# Patient Record
Sex: FEMALE | Race: WHITE | Employment: PART TIME | URBAN - METROPOLITAN AREA
[De-identification: names, ages, dates, MRNs, and addresses within clinical notes are randomized per-mention and may not be internally consistent; named-entity substitution may affect disease eponyms.]

---
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
## 2019-09-03 ENCOUNTER — OFFICE VISIT (OUTPATIENT)
Dept: FAMILY MEDICINE CLINIC | Age: 19
End: 2019-09-03
Payer: MEDICARE

## 2019-09-03 ENCOUNTER — HOSPITAL ENCOUNTER (OUTPATIENT)
Dept: GENERAL RADIOLOGY | Facility: CLINIC | Age: 19
Discharge: HOME | End: 2019-09-05
Payer: MEDICARE

## 2019-09-03 ENCOUNTER — HOSPITAL ENCOUNTER (OUTPATIENT)
Facility: CLINIC | Age: 19
Discharge: HOME | End: 2019-09-05
Payer: MEDICARE

## 2019-09-03 ENCOUNTER — HOSPITAL ENCOUNTER (OUTPATIENT)
Age: 19
Setting detail: SPECIMEN
Discharge: HOME | End: 2019-09-03
Payer: MEDICARE

## 2019-09-03 VITALS
SYSTOLIC BLOOD PRESSURE: 117 MMHG | HEART RATE: 92 BPM | TEMPERATURE: 98.2 F | BODY MASS INDEX: 20.31 KG/M2 | WEIGHT: 129.4 LBS | HEIGHT: 67 IN | DIASTOLIC BLOOD PRESSURE: 68 MMHG | OXYGEN SATURATION: 100 %

## 2019-09-03 DIAGNOSIS — R63.4 WEIGHT LOSS: ICD-10-CM

## 2019-09-03 DIAGNOSIS — R21 RASH: ICD-10-CM

## 2019-09-03 DIAGNOSIS — D72.829 LEUKOCYTOSIS, UNSPECIFIED TYPE: ICD-10-CM

## 2019-09-03 DIAGNOSIS — D72.829 LEUKOCYTOSIS, UNSPECIFIED TYPE: Primary | ICD-10-CM

## 2019-09-03 LAB
ABSOLUTE EOS #: 0.05 K/UL (ref 0–0.44)
ABSOLUTE IMMATURE GRANULOCYTE: <0.03 K/UL (ref 0–0.3)
ABSOLUTE LYMPH #: 1.6 K/UL (ref 1.2–5.2)
ABSOLUTE MONO #: 0.34 K/UL (ref 0.1–1.4)
ALBUMIN SERPL-MCNC: 4.2 G/DL (ref 3.5–5.2)
ALBUMIN/GLOBULIN RATIO: 1.6 (ref 1–2.5)
ALP BLD-CCNC: 33 U/L (ref 35–104)
ALT SERPL-CCNC: 11 U/L (ref 5–33)
ANION GAP SERPL CALCULATED.3IONS-SCNC: 14 MMOL/L (ref 9–17)
AST SERPL-CCNC: 15 U/L
BASOPHILS # BLD: 0 % (ref 0–2)
BASOPHILS ABSOLUTE: <0.03 K/UL (ref 0–0.2)
BILIRUB SERPL-MCNC: 0.22 MG/DL (ref 0.3–1.2)
BILIRUBIN, POC: NORMAL
BLOOD URINE, POC: NORMAL
BUN BLDV-MCNC: 8 MG/DL (ref 6–20)
BUN/CREAT BLD: ABNORMAL (ref 9–20)
C-REACTIVE PROTEIN: 11 MG/L (ref 0–5)
CALCIUM SERPL-MCNC: 9.1 MG/DL (ref 8.6–10.4)
CHLORIDE BLD-SCNC: 104 MMOL/L (ref 98–107)
CLARITY, POC: CLEAR
CO2: 22 MMOL/L (ref 20–31)
COLOR, POC: YELLOW
CONTROL: PRESENT
CREAT SERPL-MCNC: 0.72 MG/DL (ref 0.5–0.9)
DIFFERENTIAL TYPE: ABNORMAL
EOSINOPHILS RELATIVE PERCENT: 1 % (ref 1–4)
GFR AFRICAN AMERICAN: >60 ML/MIN
GFR NON-AFRICAN AMERICAN: >60 ML/MIN
GFR SERPL CREATININE-BSD FRML MDRD: ABNORMAL ML/MIN/{1.73_M2}
GFR SERPL CREATININE-BSD FRML MDRD: ABNORMAL ML/MIN/{1.73_M2}
GLUCOSE BLD-MCNC: 83 MG/DL (ref 70–99)
GLUCOSE URINE, POC: NORMAL
HCT VFR BLD CALC: 39.3 % (ref 36.3–47.1)
HEMOGLOBIN: 12 G/DL (ref 11.9–15.1)
IMMATURE GRANULOCYTES: 0 %
KETONES, POC: NORMAL
LACTATE DEHYDROGENASE: 193 U/L (ref 135–214)
LEUKOCYTE EST, POC: NORMAL
LYMPHOCYTES # BLD: 28 % (ref 25–45)
MCH RBC QN AUTO: 29.2 PG (ref 25.2–33.5)
MCHC RBC AUTO-ENTMCNC: 30.5 G/DL (ref 28.4–34.8)
MCV RBC AUTO: 95.6 FL (ref 82.6–102.9)
MONOCYTES # BLD: 6 % (ref 2–8)
NITRITE, POC: NORMAL
NRBC AUTOMATED: 0 PER 100 WBC
PDW BLD-RTO: 13.7 % (ref 11.8–14.4)
PH, POC: 6
PLATELET # BLD: 221 K/UL (ref 138–453)
PLATELET ESTIMATE: ABNORMAL
PMV BLD AUTO: 12.3 FL (ref 8.1–13.5)
POTASSIUM SERPL-SCNC: 4.7 MMOL/L (ref 3.7–5.3)
PREGNANCY TEST URINE, POC: NORMAL
PROTEIN, POC: NORMAL
RBC # BLD: 4.11 M/UL (ref 3.95–5.11)
RBC # BLD: ABNORMAL 10*6/UL
SEDIMENTATION RATE, ERYTHROCYTE: 5 MM (ref 0–20)
SEG NEUTROPHILS: 65 % (ref 34–64)
SEGMENTED NEUTROPHILS ABSOLUTE COUNT: 3.61 K/UL (ref 1.8–8)
SODIUM BLD-SCNC: 140 MMOL/L (ref 135–144)
SPECIFIC GRAVITY, POC: 1.03
TOTAL PROTEIN: 6.9 G/DL (ref 6.4–8.3)
TSH SERPL DL<=0.05 MIU/L-ACNC: 1.36 MIU/L (ref 0.3–5)
UROBILINOGEN, POC: 0.2
WBC # BLD: 5.6 K/UL (ref 4.5–13.5)
WBC # BLD: ABNORMAL 10*3/UL

## 2019-09-03 PROCEDURE — 84443 ASSAY THYROID STIM HORMONE: CPT

## 2019-09-03 PROCEDURE — 83615 LACTATE (LD) (LDH) ENZYME: CPT

## 2019-09-03 PROCEDURE — 85651 RBC SED RATE NONAUTOMATED: CPT

## 2019-09-03 PROCEDURE — G8427 DOCREV CUR MEDS BY ELIG CLIN: HCPCS | Performed by: PHYSICIAN ASSISTANT

## 2019-09-03 PROCEDURE — 99213 OFFICE O/P EST LOW 20 MIN: CPT | Performed by: PHYSICIAN ASSISTANT

## 2019-09-03 PROCEDURE — G8420 CALC BMI NORM PARAMETERS: HCPCS | Performed by: PHYSICIAN ASSISTANT

## 2019-09-03 PROCEDURE — 80053 COMPREHEN METABOLIC PANEL: CPT

## 2019-09-03 PROCEDURE — 82272 OCCULT BLD FECES 1-3 TESTS: CPT

## 2019-09-03 PROCEDURE — 85025 COMPLETE CBC W/AUTO DIFF WBC: CPT

## 2019-09-03 PROCEDURE — 81025 URINE PREGNANCY TEST: CPT | Performed by: PHYSICIAN ASSISTANT

## 2019-09-03 PROCEDURE — 71046 X-RAY EXAM CHEST 2 VIEWS: CPT

## 2019-09-03 PROCEDURE — 86140 C-REACTIVE PROTEIN: CPT

## 2019-09-03 PROCEDURE — 36415 COLL VENOUS BLD VENIPUNCTURE: CPT

## 2019-09-03 PROCEDURE — 1036F TOBACCO NON-USER: CPT | Performed by: PHYSICIAN ASSISTANT

## 2019-09-03 RX ORDER — NORETHINDRONE ACETATE AND ETHINYL ESTRADIOL 1.5-30(21)
KIT ORAL
Refills: 3 | COMMUNITY
Start: 2019-08-20 | End: 2020-09-23

## 2019-09-03 ASSESSMENT — ENCOUNTER SYMPTOMS
EYE DISCHARGE: 0
ABDOMINAL PAIN: 0
SINUS PRESSURE: 0
CHEST TIGHTNESS: 0
VOMITING: 0
RHINORRHEA: 0
EYE ITCHING: 0
SHORTNESS OF BREATH: 0
DIARRHEA: 0
COUGH: 0
SORE THROAT: 0
NAUSEA: 0

## 2019-09-03 ASSESSMENT — PATIENT HEALTH QUESTIONNAIRE - PHQ9
SUM OF ALL RESPONSES TO PHQ9 QUESTIONS 1 & 2: 0
SUM OF ALL RESPONSES TO PHQ QUESTIONS 1-9: 0
1. LITTLE INTEREST OR PLEASURE IN DOING THINGS: 0
SUM OF ALL RESPONSES TO PHQ QUESTIONS 1-9: 0
2. FEELING DOWN, DEPRESSED OR HOPELESS: 0

## 2019-09-05 ENCOUNTER — HOSPITAL ENCOUNTER (OUTPATIENT)
Age: 19
Setting detail: SPECIMEN
Discharge: HOME | End: 2019-09-05
Payer: MEDICARE

## 2019-09-05 ENCOUNTER — OFFICE VISIT (OUTPATIENT)
Dept: DERMATOLOGY | Age: 19
End: 2019-09-05
Payer: MEDICARE

## 2019-09-05 VITALS
HEART RATE: 89 BPM | WEIGHT: 130 LBS | BODY MASS INDEX: 20.4 KG/M2 | OXYGEN SATURATION: 100 % | SYSTOLIC BLOOD PRESSURE: 130 MMHG | DIASTOLIC BLOOD PRESSURE: 78 MMHG | HEIGHT: 67 IN

## 2019-09-05 DIAGNOSIS — L30.8 OTHER SPECIFIED DERMATITIS: Primary | ICD-10-CM

## 2019-09-05 DIAGNOSIS — L92.3 TATTOO REACTION: ICD-10-CM

## 2019-09-05 LAB
DATE, STOOL #1: NORMAL
DATE, STOOL #2: NORMAL
DATE, STOOL #3: NORMAL
HEMOCCULT SP1 STL QL: NEGATIVE
HEMOCCULT SP2 STL QL: NEGATIVE
HEMOCCULT SP3 STL QL: NEGATIVE
TIME, STOOL #1: NORMAL
TIME, STOOL #2: NORMAL
TIME, STOOL #3: NORMAL

## 2019-09-05 PROCEDURE — 11105 PUNCH BX SKIN EA SEP/ADDL: CPT | Performed by: DERMATOLOGY

## 2019-09-05 PROCEDURE — 11104 PUNCH BX SKIN SINGLE LESION: CPT | Performed by: DERMATOLOGY

## 2019-09-05 NOTE — PROGRESS NOTES
can be provided that every mistake has been identified and corrected byediting.     Electronically signed by Milo Rush MD on 9/5/19 at 3:58 PM

## 2019-09-06 RX ORDER — LIDOCAINE HYDROCHLORIDE AND EPINEPHRINE 10; 10 MG/ML; UG/ML
0.5 INJECTION, SOLUTION INFILTRATION; PERINEURAL ONCE
Status: SHIPPED | OUTPATIENT
Start: 2019-09-06

## 2019-09-12 LAB — DERMATOLOGY PATHOLOGY REPORT: NORMAL

## 2019-09-19 ENCOUNTER — HOSPITAL ENCOUNTER (OUTPATIENT)
Age: 19
Setting detail: SPECIMEN
Discharge: HOME | End: 2019-09-19
Payer: MEDICARE

## 2019-09-19 ENCOUNTER — OFFICE VISIT (OUTPATIENT)
Dept: DERMATOLOGY | Age: 19
End: 2019-09-19
Payer: MEDICARE

## 2019-09-19 VITALS
SYSTOLIC BLOOD PRESSURE: 126 MMHG | HEART RATE: 75 BPM | BODY MASS INDEX: 20.4 KG/M2 | WEIGHT: 130 LBS | OXYGEN SATURATION: 97 % | DIASTOLIC BLOOD PRESSURE: 80 MMHG | HEIGHT: 67 IN

## 2019-09-19 DIAGNOSIS — S01.331A COMPLICATION OF RIGHT EAR PIERCING, INITIAL ENCOUNTER: ICD-10-CM

## 2019-09-19 DIAGNOSIS — L30.8 OTHER SPECIFIED DERMATITIS: Primary | ICD-10-CM

## 2019-09-19 DIAGNOSIS — L30.8 OTHER SPECIFIED DERMATITIS: ICD-10-CM

## 2019-09-19 PROCEDURE — G8427 DOCREV CUR MEDS BY ELIG CLIN: HCPCS | Performed by: DERMATOLOGY

## 2019-09-19 PROCEDURE — G8420 CALC BMI NORM PARAMETERS: HCPCS | Performed by: DERMATOLOGY

## 2019-09-19 PROCEDURE — 86038 ANTINUCLEAR ANTIBODIES: CPT

## 2019-09-19 PROCEDURE — 36415 COLL VENOUS BLD VENIPUNCTURE: CPT

## 2019-09-19 PROCEDURE — 99213 OFFICE O/P EST LOW 20 MIN: CPT | Performed by: DERMATOLOGY

## 2019-09-19 PROCEDURE — 1036F TOBACCO NON-USER: CPT | Performed by: DERMATOLOGY

## 2019-09-19 RX ORDER — TRIAMCINOLONE ACETONIDE 1 MG/G
CREAM TOPICAL
Qty: 80 G | Refills: 1 | Status: SHIPPED | OUTPATIENT
Start: 2019-09-19 | End: 2021-02-26 | Stop reason: ALTCHOICE

## 2019-09-19 NOTE — PROGRESS NOTES
signs of lupus  - start triamcinolone twice daily for rash on hands and in tattoo  - consider re-biopsy in future if not improving  - ZACH Screen with Reflex; Future  - triamcinolone (KENALOG) 0.1 % cream; Apply to rash twice daily (not face, armpit or groin)  Dispense: 80 g; Refill: 1    2. Granulation tissue/early pyogenic granuloma from cartilage piercing  - remove piercing, then ok to use triamcinolone here    RTC 1 month            Patient Instructions   -Complete Lab work-up  -Apply Triamcinolone 0.1% cream twice daily to affected areas. Use a q-tip to apply medication to the ear.  -Follow up in one month.  -Take earrings out. Follow-up: No follow-ups on file. This note was created with the assistance of a speech-recognition program.  Although the intention is to generate a document that actually reflects the content of the visit, no guarantees can be provided that every mistake has been identified and corrected byediting.     Electronically signed by Lynford Cowden, MD on 9/19/19 at 4:33 PM

## 2019-09-19 NOTE — TELEPHONE ENCOUNTER
Last visit:  Last Med refill:  Does patient have enough medication for 72 hours: No:   She using it to exercise    Next Visit Date:  Future Appointments   Date Time Provider Umberto Hylton   10/4/2019 11:00 AM MYESHA De Los Santos [facility]   10/21/2019  2:30 PM Jessa Tiwari MD  derm [address] Maintenance   Topic Date Due    HPV vaccine (1 - Female 3-dose series) 05/11/2014    Chlamydia screen  05/11/2015    Varicella Vaccine (1 of 2 - 13+ 2-dose series) 09/03/2020 (Originally 5/11/2012)    DTaP/Tdap/Td vaccine (1 - Tdap) 09/03/2020 (Originally 5/11/2018)    Flu vaccine (1) 09/03/2020 (Originally 9/1/2019)    HIV screen  Completed    Meningococcal (ACWY) Vaccine  Aged Out    Pneumococcal 0-64 years Vaccine  Aged Out       No results found for: LABA1C          ( goal A1C is < 7)   No results found for: LABMICR  LDL Calculated (mg/dL)   Date Value   11/19/2018 46       (goal LDL is <100)   AST (U/L)   Date Value   09/03/2019 15     ALT (U/L)   Date Value   09/03/2019 11     BUN (mg/dL)   Date Value   09/03/2019 8     BP Readings from Last 3 Encounters:   09/19/19 126/80   09/05/19 130/78   09/03/19 117/68          (goal 120/80)    All Future Testing planned in [facility]  Lab Frequency Next Occurrence   CBC Auto Differential Once 12/16/2018   Lipid Panel Once 12/16/2018   TSH with Reflex Once 12/16/2018   HIV Screen Once 12/16/2018   Vitamin D 25 Hydroxy Once 11/16/2019   Blood Occult Stool #1 Once 09/24/2019   Surgical Pathology Once 09/06/2019   ZACH Screen with Reflex Once 09/19/2019               Patient Active Problem List:     Pyelonephritis     Leukocytosis     Family history of breast cancer

## 2019-09-19 NOTE — PATIENT INSTRUCTIONS
-Complete Lab work-up  -Apply Triamcinolone 0.1% cream twice daily to affected areas. Use a q-tip to apply medication to the ear.  -Follow up in one month.  -Take earrings out.

## 2019-09-20 LAB — ANTI-NUCLEAR ANTIBODY (ANA): NEGATIVE

## 2019-09-22 ENCOUNTER — TELEPHONE (OUTPATIENT)
Dept: DERMATOLOGY | Age: 19
End: 2019-09-22

## 2019-09-23 RX ORDER — ALBUTEROL SULFATE 90 UG/1
2 AEROSOL, METERED RESPIRATORY (INHALATION) EVERY 6 HOURS PRN
Qty: 1 INHALER | Refills: 1 | Status: SHIPPED | OUTPATIENT
Start: 2019-09-23 | End: 2020-09-16

## 2019-09-23 NOTE — TELEPHONE ENCOUNTER
Pt notified of Dr Lachelle Yeager.     Future Appointments   Date Time Provider Umberto Concetta   10/4/2019 11:00 AM MYESHA [facility]   10/21/2019  2:30 PM Courtney Osullivan MD  derm [facility]

## 2019-10-04 ENCOUNTER — OFFICE VISIT (OUTPATIENT)
Dept: FAMILY MEDICINE CLINIC | Age: 19
End: 2019-10-04
Payer: MEDICARE

## 2019-10-04 VITALS
WEIGHT: 129.2 LBS | TEMPERATURE: 98.3 F | BODY MASS INDEX: 20.28 KG/M2 | DIASTOLIC BLOOD PRESSURE: 76 MMHG | OXYGEN SATURATION: 98 % | HEIGHT: 67 IN | HEART RATE: 87 BPM | SYSTOLIC BLOOD PRESSURE: 117 MMHG

## 2019-10-04 DIAGNOSIS — D72.829 LEUKOCYTOSIS, UNSPECIFIED TYPE: Primary | ICD-10-CM

## 2019-10-04 DIAGNOSIS — R21 RASH: ICD-10-CM

## 2019-10-04 PROCEDURE — G8420 CALC BMI NORM PARAMETERS: HCPCS | Performed by: PHYSICIAN ASSISTANT

## 2019-10-04 PROCEDURE — G8427 DOCREV CUR MEDS BY ELIG CLIN: HCPCS | Performed by: PHYSICIAN ASSISTANT

## 2019-10-04 PROCEDURE — 99213 OFFICE O/P EST LOW 20 MIN: CPT | Performed by: PHYSICIAN ASSISTANT

## 2019-10-04 PROCEDURE — 1036F TOBACCO NON-USER: CPT | Performed by: PHYSICIAN ASSISTANT

## 2019-10-04 PROCEDURE — G8484 FLU IMMUNIZE NO ADMIN: HCPCS | Performed by: PHYSICIAN ASSISTANT

## 2019-10-04 ASSESSMENT — ENCOUNTER SYMPTOMS
SHORTNESS OF BREATH: 0
EYE DISCHARGE: 0
DIARRHEA: 0
SORE THROAT: 0
NAUSEA: 0
ABDOMINAL PAIN: 0
EYE ITCHING: 0
COUGH: 0
CHEST TIGHTNESS: 0
VOMITING: 0
RHINORRHEA: 0
SINUS PRESSURE: 0

## 2019-10-21 ENCOUNTER — OFFICE VISIT (OUTPATIENT)
Dept: DERMATOLOGY | Age: 19
End: 2019-10-21
Payer: MEDICARE

## 2019-10-21 ENCOUNTER — HOSPITAL ENCOUNTER (OUTPATIENT)
Age: 19
Setting detail: SPECIMEN
Discharge: HOME | End: 2019-10-21
Payer: MEDICARE

## 2019-10-21 VITALS
BODY MASS INDEX: 20.03 KG/M2 | SYSTOLIC BLOOD PRESSURE: 121 MMHG | DIASTOLIC BLOOD PRESSURE: 75 MMHG | HEART RATE: 96 BPM | WEIGHT: 127.6 LBS | OXYGEN SATURATION: 97 % | HEIGHT: 67 IN

## 2019-10-21 DIAGNOSIS — D22.9 IRRITATED NEVUS: Primary | ICD-10-CM

## 2019-10-21 PROCEDURE — 11300 SHAVE SKIN LESION 0.5 CM/<: CPT | Performed by: DERMATOLOGY

## 2019-10-24 LAB — DERMATOLOGY PATHOLOGY REPORT: NORMAL

## 2019-11-04 ENCOUNTER — TELEPHONE (OUTPATIENT)
Dept: DERMATOLOGY | Age: 19
End: 2019-11-04

## 2020-09-16 ENCOUNTER — OFFICE VISIT (OUTPATIENT)
Dept: FAMILY MEDICINE CLINIC | Age: 20
End: 2020-09-16
Payer: MEDICARE

## 2020-09-16 ENCOUNTER — HOSPITAL ENCOUNTER (OUTPATIENT)
Age: 20
Setting detail: SPECIMEN
Discharge: HOME | End: 2020-09-16
Payer: MEDICARE

## 2020-09-16 VITALS
TEMPERATURE: 97.5 F | DIASTOLIC BLOOD PRESSURE: 72 MMHG | HEIGHT: 67 IN | SYSTOLIC BLOOD PRESSURE: 109 MMHG | BODY MASS INDEX: 22.29 KG/M2 | WEIGHT: 142 LBS | OXYGEN SATURATION: 99 % | HEART RATE: 87 BPM

## 2020-09-16 LAB
HBV SURFACE AB TITR SER: <3.5 MIU/ML
RUBV IGG SER QL: 128.1 IU/ML

## 2020-09-16 PROCEDURE — 90472 IMMUNIZATION ADMIN EACH ADD: CPT | Performed by: FAMILY MEDICINE

## 2020-09-16 PROCEDURE — 90686 IIV4 VACC NO PRSV 0.5 ML IM: CPT | Performed by: FAMILY MEDICINE

## 2020-09-16 PROCEDURE — 99395 PREV VISIT EST AGE 18-39: CPT | Performed by: FAMILY MEDICINE

## 2020-09-16 PROCEDURE — 90471 IMMUNIZATION ADMIN: CPT | Performed by: FAMILY MEDICINE

## 2020-09-16 PROCEDURE — 90715 TDAP VACCINE 7 YRS/> IM: CPT | Performed by: FAMILY MEDICINE

## 2020-09-16 RX ORDER — KETOCONAZOLE 20 MG/G
CREAM TOPICAL
COMMUNITY
End: 2020-09-16

## 2020-09-16 RX ORDER — NORETHINDRONE ACETATE AND ETHINYL ESTRADIOL 1.5-30(21)
1 KIT ORAL DAILY
COMMUNITY
Start: 2019-10-10 | End: 2020-09-16

## 2020-09-16 RX ORDER — TRIAMCINOLONE ACETONIDE 1 MG/G
CREAM TOPICAL
COMMUNITY
Start: 2019-09-19 | End: 2020-09-16

## 2020-09-16 SDOH — HEALTH STABILITY: PHYSICAL HEALTH: ON AVERAGE, HOW MANY MINUTES DO YOU ENGAGE IN EXERCISE AT THIS LEVEL?: 0 MIN

## 2020-09-16 SDOH — HEALTH STABILITY: MENTAL HEALTH
STRESS IS WHEN SOMEONE FEELS TENSE, NERVOUS, ANXIOUS, OR CAN'T SLEEP AT NIGHT BECAUSE THEIR MIND IS TROUBLED. HOW STRESSED ARE YOU?: NOT AT ALL

## 2020-09-16 SDOH — HEALTH STABILITY: MENTAL HEALTH: HOW MANY STANDARD DRINKS CONTAINING ALCOHOL DO YOU HAVE ON A TYPICAL DAY?: 1 OR 2

## 2020-09-16 SDOH — HEALTH STABILITY: MENTAL HEALTH: HOW OFTEN DO YOU HAVE A DRINK CONTAINING ALCOHOL?: MONTHLY OR LESS

## 2020-09-16 SDOH — HEALTH STABILITY: PHYSICAL HEALTH: ON AVERAGE, HOW MANY DAYS PER WEEK DO YOU ENGAGE IN MODERATE TO STRENUOUS EXERCISE (LIKE A BRISK WALK)?: 0 DAYS

## 2020-09-16 ASSESSMENT — ENCOUNTER SYMPTOMS
GASTROINTESTINAL NEGATIVE: 1
EYES NEGATIVE: 1
RESPIRATORY NEGATIVE: 1

## 2020-09-16 ASSESSMENT — PATIENT HEALTH QUESTIONNAIRE - PHQ9
SUM OF ALL RESPONSES TO PHQ QUESTIONS 1-9: 0
2. FEELING DOWN, DEPRESSED OR HOPELESS: 0
1. LITTLE INTEREST OR PLEASURE IN DOING THINGS: 0
SUM OF ALL RESPONSES TO PHQ9 QUESTIONS 1 & 2: 0
SUM OF ALL RESPONSES TO PHQ QUESTIONS 1-9: 0

## 2020-09-16 NOTE — PROGRESS NOTES
[address] PRIMARY CARE  [address] [place] [address] [address] [place] [address]  Dept: [phone]  Dept Fax: [phone]    Maynor Hurst is a 23 y.o. female who presents today for her medical conditions/complaints as noted below. Maynor Hurst is c/o of   Chief Complaint   Patient presents with   Lucie Munoz Establish Care    Other     titers         HPI:     The patient presents to establish care. 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 Patient's history was reviewed. She does have a family history of breast cancer in there mom (diagnosed at 29years old) and her maternal grandmother. She has not aunts on that side. Patient has an appointment with ob gyn at the end of this week. No results found for: LABA1C          ( goal A1Cis < 7)   No results found for: LABMICR  LDL Calculated (mg/dL)   Date Value   11/19/2018 46       (goal LDL is <100)   AST (U/L)   Date Value   09/03/2019 15     ALT (U/L)   Date Value   09/03/2019 11     BUN (mg/dL)   Date Value   09/03/2019 8     BP Readings from Last 3 Encounters:   09/16/20 109/72   10/21/19 121/75   10/04/19 117/76          (goal 120/80)    History reviewed. No pertinent past medical history.    Past Surgical History:   Procedure Laterality Date    WISDOM TOOTH EXTRACTION         Family History   Problem Relation Age of Onset   Lucie Munoz Breast Cancer Mother         diagnosed at 29years old   Lucie Munoz Diabetes Father     Obesity Father     Asthma Father     Breast Cancer Maternal Grandmother     Heart Disease Maternal Grandfather     Diabetes Maternal Grandfather     Parkinsonism Maternal Grandfather     No Known Problems Paternal Grandmother     Stroke Paternal Grandfather     Cancer Maternal Uncle         throat    Diabetes Maternal Uncle        Social History     Tobacco Use    Smoking status: Never Smoker    Smokeless tobacco: Never Used   Substance Use Topics    Alcohol use: Yes     Frequency: Monthly or less     Drinks per session: 1 or 2     Binge frequency: Less than monthly      Current Outpatient Medications   Medication Sig Dispense Refill    triamcinolone (KENALOG) 0.1 % cream Apply to rash twice daily (not face, armpit or groin) 80 g 1    BLISOVI FE 1.5/30 1.5-30 MG-MCG tablet TAKE 1 TABLET BY MOUTH ONE TIME A DAY  3     Current Facility-Administered Medications   Medication Dose Route Frequency Provider Last Rate Last Dose    lidocaine-EPINEPHrine 1 percent-1:212624 injection 0.5 mL  0.5 mL Intradermal Once La Covarrubias MD         Allergies   Allergen Reactions    No Known Allergies        Health Maintenance   Topic Date Due    Chlamydia screen  05/11/2015    Cervical cancer screen  05/11/2020    Varicella vaccine (1 of 2 - 2-dose childhood series) 10/07/2020 (Originally 5/11/2000)    HPV vaccine (1 - 2-dose series) 09/16/2021 (Originally 5/11/2010)    DTaP/Tdap/Td vaccine (2 - Td) 09/16/2030    Flu vaccine  Completed    HIV screen  Completed    Hepatitis A vaccine  Aged Out    Hepatitis B vaccine  Aged Out    Hib vaccine  Aged Out    Meningococcal (ACWY) vaccine  Aged Out    Pneumococcal 0-64 years Vaccine  Aged Out       Subjective:     Review of Systems   Constitutional: Negative. HENT: Negative. Eyes: Negative. Respiratory: Negative. Cardiovascular: Negative. Gastrointestinal: Negative. Genitourinary: Negative. Musculoskeletal: Negative. Skin: Negative. Allergic/Immunologic: Negative for environmental allergies, food allergies and immunocompromised state. Neurological: Negative. Psychiatric/Behavioral: Negative. Objective:     Physical Exam  Vitals signs and nursing note reviewed. Constitutional:       General: She is awake. She is not in acute distress. Appearance: Normal appearance. She is normal weight. She is not ill-appearing, toxic-appearing or diaphoretic.    HENT:      Head: Normocephalic and atraumatic. Right Ear: Tympanic membrane, ear canal and external ear normal.      Left Ear: Tympanic membrane, ear canal and external ear normal.      Nose: Nose normal.      Mouth/Throat:      Mouth: Mucous membranes are moist.   Eyes:      Extraocular Movements: Extraocular movements intact. Conjunctiva/sclera: Conjunctivae normal.      Pupils: Pupils are equal, round, and reactive to light. Neck:      Musculoskeletal: Normal range of motion and neck supple. Cardiovascular:      Rate and Rhythm: Normal rate and regular rhythm. Pulses: Normal pulses. Heart sounds: Normal heart sounds. No murmur. Pulmonary:      Effort: Pulmonary effort is normal.      Breath sounds: Normal breath sounds. Abdominal:      General: Abdomen is flat. Bowel sounds are normal.      Palpations: Abdomen is soft. Musculoskeletal: Normal range of motion. Skin:     Capillary Refill: Capillary refill takes less than 2 seconds. Neurological:      General: No focal deficit present. Mental Status: She is alert and oriented to person, place, and time. Mental status is at baseline. Cranial Nerves: No cranial nerve deficit. Motor: No weakness. Gait: Gait normal.   Psychiatric:         Attention and Perception: Attention normal.         Mood and Affect: Mood and affect normal.         Speech: Speech normal.         Behavior: Behavior normal.         Thought Content: Thought content normal.         Judgment: Judgment normal.       /72   Pulse 87   Temp 97.5 °F (36.4 °C)   Ht 5' 7\" (1.702 m)   Wt 142 lb (64.4 kg)   LMP 08/26/2020 (Approximate)   SpO2 99%   Breastfeeding No   BMI 22.24 kg/m²     Assessment:       Diagnosis Orders   1. Healthcare maintenance     2. Immunity status testing  Hepatitis B Surface Antibody    Rubeola Antibody IgG    Mumps Antibody, IgG    Rubella Antibody, IgG    Varicella Zoster Antibody, IgG   3.  Need for influenza vaccination  Vester [place], 3 YRS AND OLDER, IM PF, PREFILL SYR OR SDV, 0.5ML (AFLURIA QUADV, PF)   4. Need for Tdap vaccination  Tdap (age 6y and older) IM ([address])   5. Screening-pulmonary TB  Mantoux testing   6. Family history of breast cancer in first degree relative     7. Skin lesion               Plan:     - encouraged patient to eat a healthy diet with fruits, vegetables and meat. Also encouraged the patient to exercise around 150 minutes/week. Patient is going to see her ob/gyn this week. I d/w her the importance of discuss breast cancer screening with Dr. Lasha Joiner at her visit d/t the family h/o breast cancer in the patient's mom. Skin lesion - appears benign. May have been dermatitis. It is improving. Advised patient to use steroid if itches or return if no improvement. Return in about 1 year (around 9/16/2021) for physical.    Orders Placed This Encounter   Procedures    Tdap (age 6y and older) IM (BOOSTRIX)    INFLUENZA, QUADV, 3 YRS AND OLDER, IM PF, PREFILL SYR OR SDV, 0.5ML (AFLURIA QUADV, PF)    Mantoux testing    Hepatitis B Surface Antibody     Standing Status:   Future     Number of Occurrences:   1     Standing Expiration Date:   9/16/2021    Rubeola Antibody IgG     Standing Status:   Future     Number of Occurrences:   1     Standing Expiration Date:   9/16/2021    Mumps Antibody, IgG     Standing Status:   Future     Number of Occurrences:   1     Standing Expiration Date:   9/16/2021    Rubella Antibody, IgG     Standing Status:   Future     Number of Occurrences:   1     Standing Expiration Date:   9/16/2021    Varicella Zoster Antibody, IgG     Standing Status:   Future     Number of Occurrences:   1     Standing Expiration Date:   9/16/2021     No orders of the defined types were placed in this encounter. Patient given educational materials - see patient instructions. Discussed use, benefit, and side effects of prescribed medications. All patientquestions answered. Pt voiced understanding. Reviewed health maintenance. Instructedto continue current medications, diet and exercise. Patient agreed with treatmentplan. Follow up as directed.      Electronically signed by Peyton Birch MD on 9/16/2020 at 12:36 PM

## 2020-09-18 ENCOUNTER — NURSE ONLY (OUTPATIENT)
Dept: PRIMARY CARE CLINIC | Age: 20
End: 2020-09-18

## 2020-09-18 ENCOUNTER — TELEPHONE (OUTPATIENT)
Dept: PRIMARY CARE CLINIC | Age: 20
End: 2020-09-18

## 2020-09-18 LAB
MEASLES IMMUNE (IGG): 4.23
MUV IGG SER QL: 3.27
VZV IGG SER QL IA: 1.72

## 2020-09-23 ENCOUNTER — NURSE ONLY (OUTPATIENT)
Dept: FAMILY MEDICINE CLINIC | Age: 20
End: 2020-09-23
Payer: COMMERCIAL

## 2020-09-23 VITALS
HEIGHT: 67 IN | SYSTOLIC BLOOD PRESSURE: 104 MMHG | TEMPERATURE: 98.2 F | DIASTOLIC BLOOD PRESSURE: 68 MMHG | WEIGHT: 142 LBS | HEART RATE: 87 BPM | RESPIRATION RATE: 16 BRPM | BODY MASS INDEX: 22.29 KG/M2

## 2020-09-23 PROCEDURE — 86580 TB INTRADERMAL TEST: CPT | Performed by: FAMILY MEDICINE

## 2020-09-23 RX ORDER — NORETHINDRONE ACETATE AND ETHINYL ESTRADIOL 1.5-30(21)
1 KIT ORAL DAILY
COMMUNITY
Start: 2020-09-18 | End: 2021-10-28 | Stop reason: ALTCHOICE

## 2020-09-25 ENCOUNTER — NURSE ONLY (OUTPATIENT)
Dept: FAMILY MEDICINE CLINIC | Age: 20
End: 2020-09-25
Payer: COMMERCIAL

## 2020-09-25 PROCEDURE — 90471 IMMUNIZATION ADMIN: CPT | Performed by: PHYSICIAN ASSISTANT

## 2020-09-25 PROCEDURE — 90746 HEPB VACCINE 3 DOSE ADULT IM: CPT | Performed by: PHYSICIAN ASSISTANT

## 2020-09-25 NOTE — PROGRESS NOTES
PPD Reading Note left arm  PPD read and results entered in [facility]. Result: 0 mm induration.   Interpretation: neg

## 2020-10-14 NOTE — PROGRESS NOTES
PPD Placement note  Candis Rodriguez, 23 y.o. female is here today for placement of PPD test  Reason for PPD test: school  Pt taken PPD test before: no  Verified in allergy area and with patient that they are not allergic to the products PPD is made of (Phenol or Tween). Yes  Is patient taking any oral or IV steroid medication now or have they taken it in the last month? no  Has the patient ever received the BCG vaccine?: no  Has the patient been in recent contact with anyone known or suspected of having active TB disease?: no       Date of exposure (if applicable):        Name of person they were exposed to (if applicable):   Patient's Country of origin?:   O: Alert and oriented in NAD. P:  PPD placed on 10/14/2020. Patient advised to return for reading within 48-72 hours.

## 2020-10-30 ENCOUNTER — TELEPHONE (OUTPATIENT)
Dept: FAMILY MEDICINE CLINIC | Age: 20
End: 2020-10-30

## 2020-10-30 NOTE — TELEPHONE ENCOUNTER
Pt is requesting to schedule her second Hep B vaccine. Can you please contact pt with scheduling information?

## 2020-11-04 ENCOUNTER — NURSE ONLY (OUTPATIENT)
Dept: FAMILY MEDICINE CLINIC | Age: 20
End: 2020-11-04
Payer: MEDICARE

## 2020-11-04 VITALS — WEIGHT: 145 LBS | HEIGHT: 67 IN | BODY MASS INDEX: 22.76 KG/M2 | TEMPERATURE: 97.2 F

## 2020-11-04 PROCEDURE — 90746 HEPB VACCINE 3 DOSE ADULT IM: CPT | Performed by: FAMILY MEDICINE

## 2020-11-04 PROCEDURE — 90471 IMMUNIZATION ADMIN: CPT | Performed by: FAMILY MEDICINE

## 2020-11-11 ENCOUNTER — OFFICE VISIT (OUTPATIENT)
Dept: FAMILY MEDICINE CLINIC | Age: 20
End: 2020-11-11
Payer: MEDICARE

## 2020-11-11 ENCOUNTER — HOSPITAL ENCOUNTER (OUTPATIENT)
Age: 20
Setting detail: SPECIMEN
Discharge: HOME | End: 2020-11-11
Payer: MEDICARE

## 2020-11-11 VITALS
TEMPERATURE: 97.5 F | DIASTOLIC BLOOD PRESSURE: 73 MMHG | HEIGHT: 67 IN | WEIGHT: 144 LBS | BODY MASS INDEX: 22.6 KG/M2 | SYSTOLIC BLOOD PRESSURE: 114 MMHG | OXYGEN SATURATION: 99 % | HEART RATE: 74 BPM

## 2020-11-11 LAB
BILIRUBIN, POC: ABNORMAL
BLOOD URINE, POC: ABNORMAL
CLARITY, POC: ABNORMAL
COLOR, POC: ABNORMAL
CONTROL: PRESENT
GLUCOSE URINE, POC: ABNORMAL
KETONES, POC: ABNORMAL
LEUKOCYTE EST, POC: ABNORMAL
NITRITE, POC: ABNORMAL
PH, POC: 5.5
PREGNANCY TEST URINE, POC: NORMAL
PROTEIN, POC: 100
SPECIFIC GRAVITY, POC: 1.02
UROBILINOGEN, POC: 0.2

## 2020-11-11 PROCEDURE — G8427 DOCREV CUR MEDS BY ELIG CLIN: HCPCS | Performed by: PHYSICIAN ASSISTANT

## 2020-11-11 PROCEDURE — 81003 URINALYSIS AUTO W/O SCOPE: CPT | Performed by: PHYSICIAN ASSISTANT

## 2020-11-11 PROCEDURE — G8482 FLU IMMUNIZE ORDER/ADMIN: HCPCS | Performed by: PHYSICIAN ASSISTANT

## 2020-11-11 PROCEDURE — 1036F TOBACCO NON-USER: CPT | Performed by: PHYSICIAN ASSISTANT

## 2020-11-11 PROCEDURE — 81025 URINE PREGNANCY TEST: CPT | Performed by: PHYSICIAN ASSISTANT

## 2020-11-11 PROCEDURE — G8420 CALC BMI NORM PARAMETERS: HCPCS | Performed by: PHYSICIAN ASSISTANT

## 2020-11-11 PROCEDURE — 99212 OFFICE O/P EST SF 10 MIN: CPT | Performed by: PHYSICIAN ASSISTANT

## 2020-11-11 RX ORDER — PHENAZOPYRIDINE HYDROCHLORIDE 200 MG/1
200 TABLET, FILM COATED ORAL 3 TIMES DAILY
Qty: 6 TABLET | Refills: 0 | Status: SHIPPED | OUTPATIENT
Start: 2020-11-11 | End: 2020-11-13

## 2020-11-11 RX ORDER — CIPROFLOXACIN 500 MG/1
500 TABLET, FILM COATED ORAL 2 TIMES DAILY
Qty: 10 TABLET | Refills: 0 | Status: SHIPPED | OUTPATIENT
Start: 2020-11-11 | End: 2020-11-16

## 2020-11-12 LAB
CULTURE: NORMAL
Lab: NORMAL
SPECIMEN DESCRIPTION: NORMAL

## 2020-11-15 ASSESSMENT — ENCOUNTER SYMPTOMS
BLOOD IN STOOL: 0
CONSTIPATION: 0
DIARRHEA: 0
COLOR CHANGE: 0
NAUSEA: 0
RECTAL PAIN: 0
ABDOMINAL DISTENTION: 0
ANAL BLEEDING: 0
VOMITING: 0
BACK PAIN: 0
ABDOMINAL PAIN: 0

## 2020-11-15 NOTE — PROGRESS NOTES
[address] PRIMARY CARE   [address]  Dept: [phone]  Dept Fax: [phone]    Office Progress Note  Date of patient's visit: 11/15/2020  Patient's Name:  Albert Gamboa YOB: 2000            PRABHA FRIEDMAN  ================================================================    REASON FOR VISIT/CHIEF COMPLAINT:  Urinary Tract Infection (3 days)    HISTORY OF PRESENTING ILLNESS:  History was obtained from: patient. Albert Gamboa is a 23 y.o. female who presents with c/o urinary frequency, pain with urination. Patient states that her symptoms initially began a couple of weeks ago and improved without treatment. She began having similar symptoms again 3 days ago which have been persistent and worsening. She denies any fevers, chills, pelvic pain, abdominal pain, back pain or vaginal discharge. She states that she has significant pain with urination. Patient denies any concerns for pregnancy or STDs.       Patient Active Problem List   Diagnosis    Pyelonephritis    Leukocytosis    Family history of breast cancer       Health Maintenance Due   Topic Date Due    Chlamydia screen  05/11/2015    Cervical cancer screen  05/11/2020       Allergies   Allergen Reactions    No Known Allergies          Current Outpatient Medications   Medication Sig Dispense Refill    ciprofloxacin (CIPRO) 500 MG tablet Take 1 tablet by mouth 2 times daily for 5 days 10 tablet 0    norethindrone-ethinyl estradiol-iron (MICROGESTIN FE1.5/30) 1.5-30 MG-MCG tablet Take 1 tablet by mouth daily      triamcinolone (KENALOG) 0.1 % cream Apply to rash twice daily (not face, armpit or groin) 80 g 1     Current Facility-Administered Medications   Medication Dose Route Frequency Provider Last Rate Last Dose    lidocaine-EPINEPHrine 1 percent-1:366496 injection 0.5 mL  0.5 mL Intradermal Once Tiffany Montes MD           Social History     Tobacco Use    tablet    Culture, Urine    POCT urine pregnancy       FOLLOW UP AND INSTRUCTIONS:  · Return if symptoms worsen or fail to improve. · Discussed use, benefit, and side effects of prescribed medications. Barriers to medication compliance addressed. All patient questions answered. Pt voiced understanding. · Patient instructed to return to the office if symptoms do not resolve or go directly to the ER if the symptoms worsen - patient voiced understanding. · Patient given educational materials - see patient instructions    Jared [address] [facility]  11/15/2020, 5:38 PM    This note is created with the assistance of a speech-recognition program. While intending to generate a document that actually reflects the content of the visit, the document can still have some mistakes which may not have been identified and corrected by editing.

## 2021-02-26 ENCOUNTER — HOSPITAL ENCOUNTER (EMERGENCY)
Age: 21
Discharge: HOME | End: 2021-02-26
Attending: SPECIALIST
Payer: MEDICARE

## 2021-02-26 ENCOUNTER — APPOINTMENT (OUTPATIENT)
Dept: GENERAL RADIOLOGY | Age: 21
End: 2021-02-26
Payer: MEDICARE

## 2021-02-26 VITALS
SYSTOLIC BLOOD PRESSURE: 121 MMHG | TEMPERATURE: 98.1 F | HEIGHT: 67 IN | DIASTOLIC BLOOD PRESSURE: 83 MMHG | RESPIRATION RATE: 16 BRPM | HEART RATE: 96 BPM | BODY MASS INDEX: 22.76 KG/M2 | WEIGHT: 145 LBS | OXYGEN SATURATION: 100 %

## 2021-02-26 DIAGNOSIS — S29.012A UPPER BACK STRAIN, INITIAL ENCOUNTER: Primary | ICD-10-CM

## 2021-02-26 LAB — HCG(URINE) PREGNANCY TEST: NEGATIVE

## 2021-02-26 PROCEDURE — 71046 X-RAY EXAM CHEST 2 VIEWS: CPT

## 2021-02-26 PROCEDURE — 72072 X-RAY EXAM THORAC SPINE 3VWS: CPT

## 2021-02-26 PROCEDURE — 99284 EMERGENCY DEPT VISIT MOD MDM: CPT

## 2021-02-26 PROCEDURE — 81025 URINE PREGNANCY TEST: CPT

## 2021-02-26 RX ORDER — CYCLOBENZAPRINE HCL 10 MG
10 TABLET ORAL 3 TIMES DAILY PRN
Qty: 15 TABLET | Refills: 0 | Status: SHIPPED | OUTPATIENT
Start: 2021-02-26 | End: 2021-03-08

## 2021-02-26 RX ORDER — IBUPROFEN 600 MG/1
600 TABLET ORAL EVERY 6 HOURS PRN
Qty: 20 TABLET | Refills: 0 | Status: SHIPPED | OUTPATIENT
Start: 2021-02-26 | End: 2021-10-01

## 2021-02-26 ASSESSMENT — PAIN DESCRIPTION - ORIENTATION: ORIENTATION: RIGHT;MID;UPPER

## 2021-02-27 NOTE — ED PROVIDER NOTES
[address]      Pt Name: Cherrie Blanco  MRN: [id number]  [place] 2000  Date of evaluation: 2/27/21      CHIEF COMPLAINT       Chief Complaint   Patient presents with    Back Pain     x5 days, upper back, mid to right side. HISTORY OF PRESENT ILLNESS    Cherrie Blanco is a 23 y.o. female who presents to the emergency department accompanied by her friend for evaluation of upper back pain in the midline as well as in the right paraspinal region since 5 days prior to arrival.  Pain has been persistent and in fact worse tonight and that she comes to the emergency department. She describes pain as sharp in character 5 out of 10 in intensity worse with the movements and deep breaths and better with rest.  She describes pain as pressure-like sensation. No history of fall or injuries. She denies any cough but admits to having slight shortness of breath. She denies any chest pain, nausea, lightheadedness, dizziness, palpitations or diaphoresis no history of fever or chills. Pain does not radiate to the upper or lower extremities and patient denies any tingling, numbness or weakness in any of the extremities. She denies any history of chronic back pain. She has taken Tylenol on the first day and ibuprofen 2-3 times a day without much relief. She has not taken any medications for the pain today. REVIEW OF SYSTEMS       Review of Systems    All systems reviewed and negative unless noted in HPI. The patient denies fever or constitutional symptoms. Denies vision change. Denies any sore throat or rhinorrhea. Denies any neck pain or stiffness. Denies chest pain or shortness of breath. No nausea,  vomiting or diarrhea. Denies any dysuria. Denies urinary frequency or hematuria. Denies any weakness, numbness or focal neurologic deficit. Denies any skin rash or edema. No recent psychiatric issues. No easy bruising or bleeding. Denies any polyuria, polydypsia       PAST MEDICAL HISTORY    has no past medical history on file. SURGICAL HISTORY      has a past surgical history that includes [place] tooth extraction. CURRENT MEDICATIONS       Discharge Medication List as of 2021  9:52 PM      CONTINUE these medications which have NOT CHANGED    Details   norethindrone-ethinyl estradiol-iron (Robyn Jhaveri FE1.530) 1.5-30 MG-MCG tablet Take 1 tablet by mouth dailyHistorical Med             ALLERGIES     is allergic to no known allergies. FAMILY HISTORY     She indicated that her mother is alive. She indicated that her father is alive. She indicated that her maternal grandmother is alive. She indicated that her maternal grandfather is . She indicated that her paternal grandmother is alive. She indicated that her paternal grandfather is alive. She indicated that both of her maternal uncles are alive. family history includes Asthma in her father; Breast Cancer in her maternal grandmother and mother; Cancer in her maternal uncle; Diabetes in her father, maternal grandfather, and maternal uncle; Heart Disease in her maternal grandfather; No Known Problems in her paternal grandmother; Obesity in her father; Parkinsonism in her maternal grandfather; Stroke in her paternal grandfather. SOCIAL HISTORY      reports that she has never smoked. She has never used smokeless tobacco. She reports current alcohol use. She reports that she does not use drugs. PHYSICAL EXAM     INITIAL VITALS:  height is 5' 7\" (1.702 m) and weight is 65.8 kg (145 lb). Her oral temperature is 98.1 °F (36.7 °C). Her blood pressure is 121/83 and her pulse is 96. Her respiration is 16 and oxygen saturation is 100%. Physical Exam  Vitals signs and nursing note reviewed. Constitutional:       Appearance: She is well-developed. HENT:      Head: Normocephalic and atraumatic.       Nose: Nose normal.   Eyes: Extraocular Movements: Extraocular movements intact. Pupils: Pupils are equal, round, and reactive to light. Neck:      Musculoskeletal: Normal range of motion and neck supple. Cardiovascular:      Rate and Rhythm: Normal rate and regular rhythm. Heart sounds: Normal heart sounds. No murmur. Pulmonary:      Effort: Pulmonary effort is normal. No respiratory distress. Breath sounds: Normal breath sounds. Abdominal:      General: Bowel sounds are normal. There is no distension. Palpations: Abdomen is soft. Tenderness: There is no abdominal tenderness. Musculoskeletal:      Thoracic back: She exhibits tenderness. She exhibits no swelling, no edema and no deformity. Comments: Patient has vague tenderness in the thoracic spine in the midline as well as right paraspinal region. There is no point tenderness and no step-off defect. There is no evidence of any erythema or edema. No evidence of any crepitus upon palpation   Skin:     General: Skin is warm and dry. Neurological:      General: No focal deficit present. Mental Status: She is alert and oriented to person, place, and time.            DIFFERENTIAL DIAGNOSIS/ MDM:     Upper back strain, fracture, bulging/herniated disc    DIAGNOSTIC RESULTS     EKG: All EKG's are interpreted by the Emergency Department Physician who either signs or Co-signs this chart in the absence of a cardiologist.    None obtained    RADIOLOGY:   Non-plain film images such as CT, Ultrasound and MRI are read by the radiologist. [facility] radiographic images are visualized and the radiologist interpretations are reviewed as follows:     Xr Chest (2 Vw)    Result Date: 2/26/2021 EXAMINATION: TWO XRAY VIEWS OF THE CHEST 2/26/2021 8:58 pm COMPARISON: 09/03/2019 HISTORY: ORDERING SYSTEM PROVIDED HISTORY: SOB TECHNOLOGIST PROVIDED HISTORY: SOB Reason for Exam: mid back pain  shARP Acuity: Acute Type of Exam: Initial FINDINGS: Cardiomediastinal silhouette is normal in size. No pulmonary consolidation, pleural effusion, or pneumothorax. No acute osseous abnormality. No acute cardiopulmonary abnormality. Xr Thoracic Spine (3 Views)    Result Date: 2/26/2021  EXAMINATION: THREE XRAY VIEWS OF THE THORACIC SPINE 2/26/2021 8:58 pm COMPARISON: None. HISTORY: ORDERING SYSTEM PROVIDED HISTORY: pain TECHNOLOGIST PROVIDED HISTORY: pain Reason for Exam: sharp back pain no injury Acuity: Acute Type of Exam: Initial FINDINGS: Thoracic spinal alignment is maintained. Vertebral body heights are maintained. No displaced fracture. Intervertebral disc heights are maintained. No acute abnormality or significant degenerative change by radiograph.          LABS:  Results for orders placed or performed during the hospital encounter of 02/26/21   Pregnancy, Urine   Result Value Ref Range    HCG(Urine) Pregnancy Test NEGATIVE NEGATIVE         EMERGENCY DEPARTMENT COURSE:   Vitals:    Vitals:    02/26/21 2041   BP: 121/83   Pulse: 96   Resp: 16   Temp: 98.1 °F (36.7 °C)   TempSrc: Oral   SpO2: 100%   Weight: 65.8 kg (145 lb)   Height: 5' 7\" (1.702 m)     -------------------------  BP: 121/83, Temp: 98.1 °F (36.7 °C), Pulse: 96, Resp: 16    Orders Placed This Encounter   Medications    ibuprofen (IBU) 600 MG tablet     Sig: Take 1 tablet by mouth every 6 hours as needed for Pain     Dispense:  20 tablet     Refill:  0    cyclobenzaprine (FLEXERIL) 10 MG tablet     Sig: Take 1 tablet by mouth 3 times daily as needed for Muscle spasms     Dispense:  15 tablet     Refill:  0 During the emergency department course, patient declined any pain medications. She has been hemodynamically stable and neurologically intact. Plan is to discharge the patient with instructions to continue Tylenol and ibuprofen as needed for the pain, Flexeril as needed for the muscle spasms, warm moist packs locally, follow-up with PCP, return if worse    CONSULTS:  None    PROCEDURES:  None    FINAL IMPRESSION      1. Upper back strain, initial encounter          DISPOSITION/PLAN       PATIENT REFERRED TO:  Ignacio Murphy MD  [address] [place] [address]  [phone]    Call in 2 days  For reevaluation of current symptoms    [facility] ED  [address] [facility] [address] [place] [address]  [phone]    If symptoms worsen      DISCHARGE MEDICATIONS:  Discharge Medication List as of 2/26/2021  9:52 PM      START taking these medications    Details   ibuprofen (IBU) 600 MG tablet Take 1 tablet by mouth every 6 hours as needed for Pain, Disp-20 tablet, R-0Normal      cyclobenzaprine (FLEXERIL) 10 MG tablet Take 1 tablet by mouth 3 times daily as needed for Muscle spasms, Disp-15 tablet, R-0Normal             (Please note that portions of this note were completed with a voice recognition program.  Efforts were made to edit the dictations but occasionally words are mis-transcribed.)    Gallardo MD, F.A.C.E.P.   Attending Emergency Medicine Physician     Hood Dorsey MD  02/27/21 1322

## 2021-05-13 ENCOUNTER — TELEPHONE (OUTPATIENT)
Dept: FAMILY MEDICINE CLINIC | Age: 21
End: 2021-05-13

## 2021-09-11 ENCOUNTER — APPOINTMENT (OUTPATIENT)
Dept: GENERAL RADIOLOGY | Age: 21
End: 2021-09-11
Payer: MEDICARE

## 2021-09-11 ENCOUNTER — HOSPITAL ENCOUNTER (EMERGENCY)
Age: 21
Discharge: HOME | End: 2021-09-11
Attending: EMERGENCY MEDICINE
Payer: MEDICARE

## 2021-09-11 VITALS
HEIGHT: 67 IN | RESPIRATION RATE: 20 BRPM | WEIGHT: 145 LBS | OXYGEN SATURATION: 98 % | BODY MASS INDEX: 22.76 KG/M2 | SYSTOLIC BLOOD PRESSURE: 143 MMHG | HEART RATE: 97 BPM | DIASTOLIC BLOOD PRESSURE: 96 MMHG | TEMPERATURE: 98.7 F

## 2021-09-11 DIAGNOSIS — J06.9 VIRAL URI WITH COUGH: Primary | ICD-10-CM

## 2021-09-11 PROCEDURE — U0005 INFEC AGEN DETEC AMPLI PROBE: HCPCS

## 2021-09-11 PROCEDURE — U0003 INFECTIOUS AGENT DETECTION BY NUCLEIC ACID (DNA OR RNA); SEVERE ACUTE RESPIRATORY SYNDROME CORONAVIRUS 2 (SARS-COV-2) (CORONAVIRUS DISEASE [COVID-19]), AMPLIFIED PROBE TECHNIQUE, MAKING USE OF HIGH THROUGHPUT TECHNOLOGIES AS DESCRIBED BY CMS-2020-01-R: HCPCS

## 2021-09-11 PROCEDURE — 71045 X-RAY EXAM CHEST 1 VIEW: CPT

## 2021-09-11 PROCEDURE — 99283 EMERGENCY DEPT VISIT LOW MDM: CPT

## 2021-09-11 PROCEDURE — 6370000000 HC RX 637 (ALT 250 FOR IP): Performed by: PHYSICIAN ASSISTANT

## 2021-09-11 RX ORDER — BENZONATATE 100 MG/1
100 CAPSULE ORAL ONCE
Status: COMPLETED | OUTPATIENT
Start: 2021-09-11 | End: 2021-09-11

## 2021-09-11 RX ADMIN — BENZONATATE 100 MG: 100 CAPSULE ORAL at 13:31

## 2021-09-11 ASSESSMENT — PAIN SCALES - GENERAL: PAINLEVEL_OUTOF10: 3

## 2021-09-11 ASSESSMENT — PAIN DESCRIPTION - LOCATION: LOCATION: CHEST;THROAT

## 2021-09-11 NOTE — ED PROVIDER NOTES
[address] [facility] ED  [address] [facility] [address]  Phone: [phone]  Fax: [phone]        Pt Name: Elayne Jauregui  MRN: [id number]  [place] 2000  Date of evaluation: 9/11/21    CHIEFCOMPLAINT       Chief Complaint   Patient presents with    URI       HISTORY OF PRESENT ILLNESS (Location/Symptom, Timing/Onset, Context/Setting, Quality, Duration, Modifying Factors, Severity)      Elayne Jauregui is a 24 y.o. female with no pertinent PMH who presents to the ED via private auto with cough and congestion. Patient is being evaluated along with her sister and their roommate who all have similar symptoms and were all out at a party on Saturday night and their symptoms all started Sunday/Monday. She states that she has been experiencing a dry cough, nasal congestion, sinus pressure/headache, and sore throat. She does feel like she is having some chest congestion as well and does have a history of exercise-induced asthma and does have an albuterol inhaler at home which she has been using without any improvement in her discomfort. Denies any chest pain or shortness of breath. They have been taking NyQuil, DayQuil, and Delsym without much improvement. She has been tolerating fluids and food though feels little fatigued. Denies any lightheadedness or dizziness or syncope. Denies any exacerbating or alleviating factors otherwise. Denies any fever, chills, nausea, vomiting, diarrhea, abdominal pain, or any other concerns at this time. PAST MEDICAL / SURGICAL / SOCIAL / FAMILY HISTORY     PMH:  has no past medical history on file. Surgical History:  has a past surgical history that includes [place] tooth extraction and Knee arthroscopy (Left, 2021). Social History:  reports that she has never smoked. She has never used smokeless tobacco. She reports current alcohol use. She reports that she does not use drugs. Family History: She indicated that her mother is alive.  She indicated that her father is alive. She indicated that her maternal grandmother is alive. She indicated that her maternal grandfather is . She indicated that her paternal grandmother is alive. She indicated that her paternal grandfather is alive. She indicated that both of her maternal uncles are alive. family history includes Asthma in her father; Breast Cancer in her maternal grandmother and mother; Cancer in her maternal uncle; Diabetes in her father, maternal grandfather, and maternal uncle; Heart Disease in her maternal grandfather; No Known Problems in her paternal grandmother; Obesity in her father; Parkinsonism in her maternal grandfather; Stroke in her paternal grandfather. Psychiatric History: None    Allergies: No known allergies    Home Medications:   Prior to Admission medications    Medication Sig Start Date End Date Taking? Authorizing Provider   ibuprofen (IBU) 600 MG tablet Take 1 tablet by mouth every 6 hours as needed for Pain 2/26/21 3/5/21  Thu Alvarez MD   norethindrone-ethinyl estradiol-iron (Nicolás Lopes FE) 1.5-30 MG-MCG tablet Take 1 tablet by mouth daily 20   Historical Provider, MD       REVIEW OF SYSTEMS  (2-9 systems for level 4, 10 ormore for level 5)      Review of Systems    Constitutional: See HPI. Eyes: Denies vision changes. HENT:  See HPI. Respiratory:  See HPI. Cardiovascular:  See HPI. GI:  See HPI. Musculoskeletal: Denies recent trauma. Skin: Denies new rashes or wounds. Neurologic:  Denies new numbness or weakness. Heme: Denies bleeding disorders. All other systems negative except as marked. PHYSICAL EXAM  (up to 7 for level 4, 8 or more for level 5)      INITIAL VITALS:  height is 5' 7\" (1.702 m) and weight is 65.8 kg (145 lb). Her oral temperature is 98.7 °F (37.1 °C). Her blood pressure is 143/96 (abnormal) and her pulse is 97. Her respiration is 20 and oxygen saturation is 98%. Vital signs reviewed.     Physical Exam    General: Alert, cooperative, well-groomed, well-nourished, appears stated age, and is in no acute distress. Head:  Normocephalic, atraumatic, and without obvious abnormality. Eyes:  Sclerae/conjunctivae clear without injection, pallor, or icterus. Corneas clear without opacities. EOM's intact. ENT: Ears and nose are all without obvious masses lesion or deformity. Oropharynx is clear without significant erythema or edema. TMs are clear. Neck: Supple and symmetrical. Trachea midline. No adenopathy. No jugular venous distention. Lungs:   No respiratory distress. Clear to auscultation bilaterally. No wheezes, rhonchi, or rales. Heart:  Regular rate. Regular rhythm. No murmurs, rubs, or gallops. Abdomen:   Soft, nontender, nondistended without guarding or rebound. Skin: Warm and dry. Soft, good turgor, and well-hydrated. No obvious rashes or lesions. Neurologic: GCS is 15 and no focal deficits are appreciated. Normal gait. Grossly normal motor and sensation. Speech clear. Psychiatric: Normal mood and affect. Normal behavior. Coherent thought process. DIFFERENTIAL DIAGNOSIS / MDM     The patient's signs and symptoms are consistent with an acute mild viral illness. At this time there is significant evidence of Covid-19 community spread due to this pandemic, and I feel the patient's symptoms are likely consistent with a mild Covid-19 infection. The patient is nontoxic and well appearing, no evidence of hypoxia or impending respiratory failure. She has mild hypertension and borderline tachycardia, but is otherwise unremarkable and is tolerating p.o. I do not feel the patient has evidence of significant dehydration or end organ failure at this time. The patient does not have risk factors for severe disease such as cardiovascular disease, chronic hypertension, uncontrolled diabetes, chronic respiratory disease, underlying malignancy, immunocompromised, Age > 60 years.   I do not feel the patient has an their interpretations are reviewed. XR CHEST PORTABLE    Result Date: 9/11/2021  EXAMINATION: ONE XRAY VIEW OF THE CHEST 9/11/2021 1:29 pm COMPARISON: 02/26/2021 HISTORY: ORDERING SYSTEM PROVIDED HISTORY: cough; congestion TECHNOLOGIST PROVIDED HISTORY: cough; congestion Reason for Exam: cough; congestion Acuity: Acute Type of Exam: Initial FINDINGS: The lungs are without acute focal process. There is no effusion or pneumothorax. The cardiomediastinal silhouette is stable. The osseous structures are stable. No acute process. LABS:  No results found for this visit on 09/11/21. EMERGENCY DEPARTMENT COURSE           Vitals:    Vitals:    09/11/21 1230   BP: (!) 143/96   Pulse: 97   Resp: 20   Temp: 98.7 °F (37.1 °C)   TempSrc: Oral   SpO2: 98%   Weight: 65.8 kg (145 lb)   Height: 5' 7\" (1.702 m)     -------------------------  BP: (!) 143/96, Temp: 98.7 °F (37.1 °C), Pulse: 97, Resp: 20      RE-EVALUATION:  See MDM. CONSULTS:  None    PROCEDURES:  None    FINAL IMPRESSION      1. Viral URI with cough          DISPOSITION / PLAN     CONDITION ON DISPOSITION:   Good / Stable for discharge.      PATIENT REFERRED TO:  Raheem Bruce MD  [facility] [address] [facility]  [phone]    Call in 2 days  Schedule an appointment for follow-up      DISCHARGE MEDICATIONS:  Discharge Medication List as of 9/11/2021  1:53 PM          Digna Rivera   Emergency Medicine Physician Assistant    (Please note that portions of this note were completed with a voice recognition program.  Efforts were made to edit the dictations but occasionally words aremis-transcribed.)        Evans Ortiz PA-C  09/11/21 7823

## 2021-09-11 NOTE — ED PROVIDER NOTES
[address] [facility] ED    [address] [facility] [address]  Phone: [phone]  Fax: [phone]  Emergency Department  Faculty Attestation    I performed a history and physical examination of the patient and discussed management with the mid level provideer. I reviewed the mid level provider's note and agree with the documented findings and plan of care. Any areas of disagreement are noted on the chart. I was personally present for the key portions of any procedures. I have documented in the chart those procedures where I was not present during the key portions. I have reviewed the emergency nurses triage note. I agree with the chief complaint, past medical history, past surgical history, allergies, medications, social and family history as documented unless otherwise noted below. Documentation of the HPI, Physical Exam and Medical Decision Making performed by medical students or scribes is based on my personal performance of the HPI, PE and MDM. For Physician Assistant/ Nurse Practitioner cases/documentation I have personally evaluated this patient and have completed at least one if not all key elements of the E/M (history, physical exam, and MDM). Additional findings are as noted. Primary Care Physician:  Zach Chirinos MD    CHIEF COMPLAINT       Chief Complaint   Patient presents with    URI       RECENT VITALS:   Temp: 98.7 °F (37.1 °C),  Pulse: 97, Resp: 20, BP: (!) 143/96    LABS:  Labs Reviewed   COVID-19         XR CHEST PORTABLE (Final result)  Result time 09/11/21 14:21:45  Final result by Jossy Gagnon MD (09/11/21 14:21:45)                Impression:    No acute process.              Narrative:    EXAMINATION:   ONE XRAY VIEW OF THE CHEST     9/11/2021 1:29 pm     COMPARISON:   02/26/2021     HISTORY:   ORDERING SYSTEM PROVIDED HISTORY: cough; congestion   TECHNOLOGIST PROVIDED HISTORY:   cough; congestion   Reason for Exam: cough; congestion   Acuity: Acute   Type of Exam: Initial     FINDINGS:   The lungs are without acute focal process.  There is no effusion or   pneumothorax. The cardiomediastinal silhouette is stable. The osseous   structures are stable.                       PERTINENT ATTENDING PHYSICIAN COMMENTS:    The patient presents with cough and URI symptoms for the past 6 or 7 days. She is concerned about Covid. Her house mates and sister have the same illness. On exam, the patient is moving air well but has a very moist cough which is frequent. She has no wheezing. A Covid swab was obtained. We will notify her of the results. She may use over-the-counter remedies for her discomfort. She is discharged in good condition.          Celsa Wilson MD  09/12/21 5888

## 2021-09-12 LAB
SARS-COV-2: NORMAL
SARS-COV-2: NOT DETECTED
SOURCE: NORMAL

## 2021-09-14 ENCOUNTER — TELEPHONE (OUTPATIENT)
Dept: FAMILY MEDICINE CLINIC | Age: 21
End: 2021-09-14

## 2021-09-14 ENCOUNTER — TELEMEDICINE (OUTPATIENT)
Dept: FAMILY MEDICINE CLINIC | Age: 21
End: 2021-09-14
Payer: MEDICARE

## 2021-09-14 DIAGNOSIS — Z86.16 HISTORY OF COVID-19: ICD-10-CM

## 2021-09-14 DIAGNOSIS — J01.00 ACUTE NON-RECURRENT MAXILLARY SINUSITIS: Primary | ICD-10-CM

## 2021-09-14 PROCEDURE — G8420 CALC BMI NORM PARAMETERS: HCPCS | Performed by: STUDENT IN AN ORGANIZED HEALTH CARE EDUCATION/TRAINING PROGRAM

## 2021-09-14 PROCEDURE — 99213 OFFICE O/P EST LOW 20 MIN: CPT | Performed by: STUDENT IN AN ORGANIZED HEALTH CARE EDUCATION/TRAINING PROGRAM

## 2021-09-14 PROCEDURE — G8427 DOCREV CUR MEDS BY ELIG CLIN: HCPCS | Performed by: STUDENT IN AN ORGANIZED HEALTH CARE EDUCATION/TRAINING PROGRAM

## 2021-09-14 PROCEDURE — 1036F TOBACCO NON-USER: CPT | Performed by: STUDENT IN AN ORGANIZED HEALTH CARE EDUCATION/TRAINING PROGRAM

## 2021-09-14 RX ORDER — FLUTICASONE PROPIONATE 50 MCG
1 SPRAY, SUSPENSION (ML) NASAL DAILY
Qty: 32 G | Refills: 1 | Status: SHIPPED | OUTPATIENT
Start: 2021-09-14

## 2021-09-14 RX ORDER — ALBUTEROL SULFATE 90 UG/1
2 AEROSOL, METERED RESPIRATORY (INHALATION) 4 TIMES DAILY PRN
Qty: 18 G | Refills: 0 | Status: SHIPPED | OUTPATIENT
Start: 2021-09-14

## 2021-09-14 RX ORDER — AMOXICILLIN AND CLAVULANATE POTASSIUM 875; 125 MG/1; MG/1
1 TABLET, FILM COATED ORAL 2 TIMES DAILY
Qty: 28 TABLET | Refills: 0 | Status: SHIPPED | OUTPATIENT
Start: 2021-09-14 | Stop reason: SDUPTHER

## 2021-09-14 RX ORDER — GUAIFENESIN 600 MG/1
1200 TABLET, EXTENDED RELEASE ORAL 2 TIMES DAILY
Qty: 40 TABLET | Refills: 0 | Status: SHIPPED | OUTPATIENT
Start: 2021-09-14 | End: 2021-09-24

## 2021-09-14 RX ORDER — PREDNISONE 20 MG/1
20 TABLET ORAL 2 TIMES DAILY
Qty: 10 TABLET | Refills: 0 | Status: SHIPPED | OUTPATIENT
Start: 2021-09-14 | Stop reason: SDUPTHER

## 2021-09-14 SDOH — ECONOMIC STABILITY: TRANSPORTATION INSECURITY
IN THE PAST 12 MONTHS, HAS THE LACK OF TRANSPORTATION KEPT YOU FROM MEDICAL APPOINTMENTS OR FROM GETTING MEDICATIONS?: NO

## 2021-09-14 SDOH — ECONOMIC STABILITY: FOOD INSECURITY: WITHIN THE PAST 12 MONTHS, YOU WORRIED THAT YOUR FOOD WOULD RUN OUT BEFORE YOU GOT MONEY TO BUY MORE.: NEVER TRUE

## 2021-09-14 SDOH — ECONOMIC STABILITY: FOOD INSECURITY: WITHIN THE PAST 12 MONTHS, THE FOOD YOU BOUGHT JUST DIDN'T LAST AND YOU DIDN'T HAVE MONEY TO GET MORE.: NEVER TRUE

## 2021-09-14 SDOH — ECONOMIC STABILITY: TRANSPORTATION INSECURITY
IN THE PAST 12 MONTHS, HAS LACK OF TRANSPORTATION KEPT YOU FROM MEETINGS, WORK, OR FROM GETTING THINGS NEEDED FOR DAILY LIVING?: NO

## 2021-09-14 ASSESSMENT — ENCOUNTER SYMPTOMS
ABDOMINAL PAIN: 0
SHORTNESS OF BREATH: 0
CHEST TIGHTNESS: 0
DIARRHEA: 0
CONSTIPATION: 0
NAUSEA: 0
EYE DISCHARGE: 1
COUGH: 1
SORE THROAT: 1
WHEEZING: 0
VOMITING: 0
SINUS PRESSURE: 1
SINUS PAIN: 1

## 2021-09-14 ASSESSMENT — PATIENT HEALTH QUESTIONNAIRE - PHQ9
SUM OF ALL RESPONSES TO PHQ QUESTIONS 1-9: 0
SUM OF ALL RESPONSES TO PHQ QUESTIONS 1-9: 0
1. LITTLE INTEREST OR PLEASURE IN DOING THINGS: 0
SUM OF ALL RESPONSES TO PHQ9 QUESTIONS 1 & 2: 0
SUM OF ALL RESPONSES TO PHQ QUESTIONS 1-9: 0
2. FEELING DOWN, DEPRESSED OR HOPELESS: 0

## 2021-09-14 ASSESSMENT — SOCIAL DETERMINANTS OF HEALTH (SDOH): HOW HARD IS IT FOR YOU TO PAY FOR THE VERY BASICS LIKE FOOD, HOUSING, MEDICAL CARE, AND HEATING?: NOT HARD AT ALL

## 2021-09-14 NOTE — TELEPHONE ENCOUNTER
[facility] ([facility] ED Follow up Call    Reason for ED visit:  Viral visit      9/14/2021     Hi Vipul Amin , this is Lay  from Dr. Smith Cap office, just calling to see how you are doing after your recent ED visit. Did you receive discharge instructions? Yes  Do you understand the discharge instructions? Yes  Did the ED give you any new prescriptions? Yes  Were you able to fill your prescriptions? Yes          Do you have one of our red, yellow and green  Zone sheets that help you to determine when you should go to the ED? Not Applicable      Do you need or want to make a follow up appt with your PCP? Do you have any further needs in the home, e.g. equipment? Not Applicable        FU appts/Provider:    No future appointments.

## 2021-09-14 NOTE — PROGRESS NOTES
2021    TELEHEALTH EVALUATION -- Audio/Visual (During IPFRN-54 public health emergency)    HPI:    Nohemy Donahue (:  2000) has requested an audio/video evaluation for the following concern(s):    She is following up on her recent ER visit. She says that for past 2 weeks she has been having sinus pressure and congestion with greenish sputum. She is also been having productive cough with phlegm. She has tried over-the-counter decongestants and cough medications. She went to ER and was tested for Covid which came back negative. She was prescribed some Tessalon Perles which have not helped at all. She says that she feels fatigued and tired all the time. She had Covid back in July and since then has not gotten her taste and smell back yet. Says that she has not felt good since she had Covid. She denies any nausea or vomiting or diarrhea. Says she has very poor appetite. She is also noticed some discharge from her eyes that started 2 days ago. Review of Systems   Constitutional: Positive for appetite change and fatigue. Negative for fever. HENT: Positive for congestion, sinus pressure, sinus pain and sore throat. Negative for ear pain and hearing loss. Eyes: Positive for discharge. Negative for visual disturbance. Respiratory: Positive for cough. Negative for chest tightness, shortness of breath and wheezing. Cardiovascular: Negative for chest pain, palpitations and leg swelling. Gastrointestinal: Negative for abdominal pain, constipation, diarrhea, nausea and vomiting. Genitourinary: Negative for flank pain, frequency, hematuria and urgency. Musculoskeletal: Negative for arthralgias, gait problem, joint swelling and myalgias. Skin: Negative. Neurological: Positive for headaches. Negative for dizziness, weakness and numbness. Psychiatric/Behavioral: Negative. Negative for dysphoric mood. The patient is not nervous/anxious.         Prior to Visit Medications    Medication Sig Taking? Authorizing Provider   albuterol sulfate HFA (VENTOLIN HFA) 108 (90 Base) MCG/ACT inhaler Inhale 2 puffs into the lungs 4 times daily as needed for Wheezing Yes Brock Wilson MD   guaiFENesin (MUCINEX) 600 MG extended release tablet Take 2 tablets by mouth 2 times daily for 10 days Yes Brock Wilson MD   fluticasone (FLONASE) 50 MCG/ACT nasal spray 1 spray by Each Nostril route daily Yes Brock Wilson MD   amoxicillin-clavulanate (AUGMENTIN) 875-125 MG per tablet Take 1 tablet by mouth 2 times daily for 14 days Yes Brock Wilson MD   predniSONE (DELTASONE) 20 MG tablet Take 1 tablet by mouth 2 times daily for 5 days Yes Brock Wilson MD   norethindrone-ethinyl estradiol-iron (Ramandeep Parcel FE1.5/30) 1.5-30 MG-MCG tablet Take 1 tablet by mouth daily Yes Historical Provider, MD   ibuprofen (IBU) 600 MG tablet Take 1 tablet by mouth every 6 hours as needed for Pain  Rj Vaughn MD       Social History     Tobacco Use    Smoking status: Never Smoker    Smokeless tobacco: Never Used   Vaping Use    Vaping Use: Never used   Substance Use Topics    Alcohol use: Yes    Drug use: Never        Allergies   Allergen Reactions    No Known Allergies    , History reviewed. No pertinent past medical history. ,   Past Surgical History:   Procedure Laterality Date    KNEE ARTHROSCOPY Left 2021    With Dr. Rodriguez Holding fat pad debridement    WISDOM TOOTH EXTRACTION     ,   Social History     Tobacco Use    Smoking status: Never Smoker    Smokeless tobacco: Never Used   Vaping Use    Vaping Use: Never used   Substance Use Topics    Alcohol use:  Yes    Drug use: Never   ,   Family History   Problem Relation Age of Onset   [facility] Breast Cancer Mother         diagnosed at 29years old   [facility] Diabetes Father     Obesity Father     Asthma Father     Breast Cancer Maternal Grandmother     Heart Disease Maternal Grandfather     Diabetes Maternal Grandfather     Parkinsonism Maternal Grandfather     No Known Problems Paternal Grandmother     Stroke Paternal Grandfather     Cancer Maternal Uncle         throat    Diabetes Maternal Uncle    ,   Immunization History   Administered Date(s) Administered    DTaP vaccine 1999, 1999, 1999, 09/10/2000, 07/23/2004    HPV Bivalent (Cervarix) 06/10/2014, 08/28/2015    Hepatitis A Ped/Adol (Havrix, Vaqta) 09/05/2017    Hepatitis B Adult (Engerix-B) 09/25/2020, 11/04/2020    Hepatitis B Ped/Adol (Engerix-B, Recombivax HB) 1999, 1999, 02/24/2000    Hib vaccine 1999, 1999, 1999, 09/01/2000    Influenza, [place] Pulse, IM, PF (6 mo and older Fluzone, Flulaval, Fluarix, and 3 yrs and older Afluria) 09/05/2017, 09/16/2020    MMR 09/01/2000, 07/23/2004    Meningococcal MCV4P (Menactra) 08/28/2015    PPD Test 09/23/2020    Pneumococcal Conjugate 7-valent (Prevnar7) 12/05/2000    Pneumococcal Polysaccharide (Cknckynlw53) 01/15/2016    Polio IPV (IPOL) 07/23/2003    Polio Virus Vaccine 1999, 1999, 02/24/2000    Tdap (Boostrix, Adacel) 10/21/2010, 08/23/2011, 09/16/2020    Varicella (Varivax) 09/01/2000, 05/21/2009   ,   Health Maintenance   Topic Date Due    Hepatitis C screen  Never done    COVID-19 Vaccine (1) Never done    Chlamydia screen  Never done    Pap smear  Never done    Flu vaccine (1) 09/01/2021    HPV vaccine (3 - 3-dose series) 09/16/2021 (Originally 12/28/2015)    Hepatitis A vaccine (2 of 2 - 2-dose series) 11/11/2021 (Originally 3/5/2018)    DTaP/Tdap/Td vaccine (9 - Td or Tdap) 09/16/2030    Hepatitis B vaccine  Completed    Hib vaccine  Completed    Varicella vaccine  Completed    Meningococcal (ACWY) vaccine  Completed    HIV screen  Completed    Pneumococcal 0-64 years Vaccine  Aged Out       PHYSICAL EXAMINATION:  [ INSTRUCTIONS:  \"[x]\" Indicates a positive item  \"[]\" Indicates a negative item  -- DELETE ALL ITEMS NOT EXAMINED]  Vital Signs: (As obtained by patient/caregiver or practitioner observation)    Blood pressure-  Heart rate-    Respiratory rate-    Temperature-  Pulse oximetry-     Constitutional: [x] Appears well-developed and well-nourished [x] No apparent distress      [] Abnormal-   Mental status  [x] Alert and awake  [x] Oriented to person/place/time [x]Able to follow commands      Eyes:  EOM    [x]  Normal  [] Abnormal-  Sclera  [x]  Normal  [] Abnormal -         Discharge [x]  None visible  [] Abnormal -    HENT:   [x] Normocephalic, atraumatic. [] Abnormal   [x] Mouth/Throat: Mucous membranes are moist.     External Ears [x] Normal  [] Abnormal-     Neck: [x] No visualized mass     Pulmonary/Chest: [x] Respiratory effort normal.  [x] No visualized signs of difficulty breathing or respiratory distress        [] Abnormal-      Musculoskeletal:   [] Normal gait with no signs of ataxia         [x] Normal range of motion of neck        [] Abnormal-       Neurological:        [x] No Facial Asymmetry (Cranial nerve 7 motor function) (limited exam to video visit)          [x] No gaze palsy        [] Abnormal-         Skin:        [x] No significant exanthematous lesions or discoloration noted on facial skin         [] Abnormal-            Psychiatric:       [x] Normal Affect [x] No Hallucinations        [] Abnormal-     Other pertinent observable physical exam findings-     ASSESSMENT/PLAN:  1. Acute non-recurrent maxillary sinusitis    - albuterol sulfate HFA (VENTOLIN HFA) 108 (90 Base) MCG/ACT inhaler; Inhale 2 puffs into the lungs 4 times daily as needed for Wheezing  Dispense: 18 g; Refill: 0  - guaiFENesin (MUCINEX) 600 MG extended release tablet; Take 2 tablets by mouth 2 times daily for 10 days  Dispense: 40 tablet; Refill: 0  - fluticasone (FLONASE) 50 MCG/ACT nasal spray; 1 spray by Each Nostril route daily  Dispense: 32 g; Refill: 1  - amoxicillin-clavulanate (AUGMENTIN) 875-125 MG per tablet; Take 1 tablet by mouth 2 times daily for 14 days  Dispense: 28 tablet;  Refill: 0  - predniSONE (DELTASONE) 20 MG tablet; Take 1 tablet by mouth 2 times daily for 5 days  Dispense: 10 tablet; Refill: 0    2. History of COVID-19    History of Covid in July, since then has had on and off sore throat, loss of voice, cough and fatigue. Has had persistent loss of sense of taste and smell. No follow-ups on file. Kandy White, was evaluated through a synchronous (real-time) audio-video encounter. The patient (or guardian if applicable) is aware that this is a billable service. Verbal consent to proceed has been obtained within the past 12 months. The visit was conducted pursuant to the emergency declaration under the [address] [place] [address], [address] [place] [address] authority and the [employer] and [employer] General Act. Patient identification was verified, and a caregiver was present when appropriate. The patient was located in a state where the provider was credentialed to provide care. Total time spent on this encounter: Not billed by time    --Andi De La Garza MD on 9/14/2021 at 5:42 PM    An electronic signature was used to authenticate this note.

## 2021-09-30 ENCOUNTER — TELEPHONE (OUTPATIENT)
Dept: FAMILY MEDICINE CLINIC | Age: 21
End: 2021-09-30

## 2021-10-01 ENCOUNTER — HOSPITAL ENCOUNTER (OUTPATIENT)
Age: 21
Setting detail: SPECIMEN
Discharge: HOME | End: 2021-10-01
Payer: MEDICARE

## 2021-10-01 ENCOUNTER — OFFICE VISIT (OUTPATIENT)
Dept: FAMILY MEDICINE CLINIC | Age: 21
End: 2021-10-01
Payer: MEDICARE

## 2021-10-01 VITALS
DIASTOLIC BLOOD PRESSURE: 71 MMHG | OXYGEN SATURATION: 98 % | HEART RATE: 94 BPM | SYSTOLIC BLOOD PRESSURE: 119 MMHG | TEMPERATURE: 99 F | RESPIRATION RATE: 13 BRPM

## 2021-10-01 DIAGNOSIS — R30.0 DYSURIA: ICD-10-CM

## 2021-10-01 DIAGNOSIS — N89.8 VAGINAL DISCHARGE: ICD-10-CM

## 2021-10-01 DIAGNOSIS — N89.8 VAGINAL ITCHING: ICD-10-CM

## 2021-10-01 DIAGNOSIS — N89.8 VAGINAL ITCHING: Primary | ICD-10-CM

## 2021-10-01 LAB
BILIRUBIN, POC: NEGATIVE
BLOOD URINE, POC: NORMAL
CLARITY, POC: CLEAR
COLOR, POC: YELLOW
DIRECT EXAM: NORMAL
GLUCOSE URINE, POC: NEGATIVE
KETONES, POC: NEGATIVE
LEUKOCYTE EST, POC: NORMAL
Lab: NORMAL
NITRITE, POC: NEGATIVE
PH, POC: 5.5
PROTEIN, POC: NEGATIVE
SPECIFIC GRAVITY, POC: >=1.03
SPECIMEN DESCRIPTION: NORMAL
UROBILINOGEN, POC: NORMAL

## 2021-10-01 PROCEDURE — G8427 DOCREV CUR MEDS BY ELIG CLIN: HCPCS | Performed by: NURSE PRACTITIONER

## 2021-10-01 PROCEDURE — 99213 OFFICE O/P EST LOW 20 MIN: CPT | Performed by: NURSE PRACTITIONER

## 2021-10-01 PROCEDURE — 1036F TOBACCO NON-USER: CPT | Performed by: NURSE PRACTITIONER

## 2021-10-01 PROCEDURE — G8420 CALC BMI NORM PARAMETERS: HCPCS | Performed by: NURSE PRACTITIONER

## 2021-10-01 PROCEDURE — G8484 FLU IMMUNIZE NO ADMIN: HCPCS | Performed by: NURSE PRACTITIONER

## 2021-10-01 RX ORDER — FLUCONAZOLE 150 MG/1
150 TABLET ORAL ONCE
Qty: 1 TABLET | Refills: 0 | Status: SHIPPED | OUTPATIENT
Start: 2021-10-01 | End: 2021-10-01

## 2021-10-01 ASSESSMENT — ENCOUNTER SYMPTOMS
ABDOMINAL PAIN: 0
DIARRHEA: 0
NAUSEA: 0
ABDOMINAL DISTENTION: 0
VOMITING: 0

## 2021-10-01 ASSESSMENT — PATIENT HEALTH QUESTIONNAIRE - PHQ9
1. LITTLE INTEREST OR PLEASURE IN DOING THINGS: 0
SUM OF ALL RESPONSES TO PHQ QUESTIONS 1-9: 0
SUM OF ALL RESPONSES TO PHQ QUESTIONS 1-9: 0
2. FEELING DOWN, DEPRESSED OR HOPELESS: 0
SUM OF ALL RESPONSES TO PHQ QUESTIONS 1-9: 0
SUM OF ALL RESPONSES TO PHQ9 QUESTIONS 1 & 2: 0

## 2021-10-01 NOTE — PROGRESS NOTES
[address] WALK-IN  [facility] [address] [facility] [address]  Dept: [phone]  Dept Fax: [phone]    Kenny Zhu is a 24 y.o. female who presents today for her medical conditions/complaints of   Chief Complaint   Patient presents with    Other     possible yeast infection, burning when urinating, itching, feels inflammed          HPI:     /71   Pulse 94   Temp 99 °F (37.2 °C)   Resp 13   SpO2 98%   Breastfeeding No       HPI  Pt presented to the clinic today with c/o vaginal discharge. This is a new problem. The current episode started 3 days ago. The problem has been unchanged since onset. Associated symptoms include: burning with urination- not consistent, vaginal irritation, vaginal itching . Pertinent negatives include: No fever, chills, nausea, abdominal pain, vaginal pain, flank pain . Pt has tried nothing with no improvement. Has implant  On period now. No concern for STD. No past medical history on file. Past Surgical History:   Procedure Laterality Date    KNEE ARTHROSCOPY Left 2021    With Dr. Fox Mayorga fat pad debridement    WISDOM TOOTH EXTRACTION         Family History   Problem Relation Age of Onset    Breast Cancer Mother         diagnosed at 29years old   [facility] Diabetes Father     Obesity Father     Asthma Father     Breast Cancer Maternal Grandmother     Heart Disease Maternal Grandfather     Diabetes Maternal Grandfather     Parkinsonism Maternal Grandfather     No Known Problems Paternal Grandmother     Stroke Paternal Grandfather     Cancer Maternal Uncle         throat    Diabetes Maternal Uncle        Social History     Tobacco Use    Smoking status: Never Smoker    Smokeless tobacco: Never Used   Substance Use Topics    Alcohol use: Yes        Prior to Visit Medications    Medication Sig Taking?  Authorizing Provider   fluconazole (DIFLUCAN) 150 MG tablet Take 1 tablet by mouth once for 1 dose Yes Layman Nissen, APRN - CNP   albuterol sulfate HFA (VENTOLIN HFA) 108 (90 Base) MCG/ACT inhaler Inhale 2 puffs into the lungs 4 times daily as needed for Wheezing Yes Ana Sierra MD   fluticasone (FLONASE) 50 MCG/ACT nasal spray 1 spray by Each Nostril route daily Yes Ana Sierra MD   ibuprofen (IBU) 600 MG tablet Take 1 tablet by mouth every 6 hours as needed for Pain Yes Virginia Gracia MD   norethindrone-ethinyl estradiol-iron (Sandy Perdomo FE1.5/30) 1.5-30 MG-MCG tablet Take 1 tablet by mouth daily Yes Historical Provider, MD       Allergies   Allergen Reactions    No Known Allergies          Subjective:      Review of Systems   Constitutional: Negative for chills and fever. Gastrointestinal: Negative for abdominal distention, abdominal pain, diarrhea, nausea and vomiting. Genitourinary: Positive for dysuria and vaginal discharge. Negative for difficulty urinating, flank pain, pelvic pain and urgency. Skin: Negative for rash. Neurological: Negative for weakness, light-headedness and headaches. Psychiatric/Behavioral: Negative for sleep disturbance. Objective:     Physical Exam  Vitals and nursing note reviewed. Constitutional:       General: She is not in acute distress. Appearance: Normal appearance. HENT:      Head: Normocephalic and atraumatic. Right Ear: Ear canal and external ear normal.      Left Ear: Ear canal and external ear normal.      Nose: Nose normal.      Mouth/Throat:      Mouth: Mucous membranes are moist.   Eyes:      Extraocular Movements: Extraocular movements intact. Conjunctiva/sclera: Conjunctivae normal.   Cardiovascular:      Rate and Rhythm: Normal rate and regular rhythm. Pulses: Normal pulses. Pulmonary:      Effort: Pulmonary effort is normal.      Breath sounds: Normal breath sounds. Abdominal:      General: Bowel sounds are normal. There is no distension. Palpations: Abdomen is soft. Tenderness:  There is no abdominal tenderness. There is no right CVA tenderness or left CVA tenderness. Musculoskeletal:         General: Normal range of motion. Cervical back: Normal range of motion and neck supple. Skin:     General: Skin is warm and dry. Neurological:      Mental Status: She is alert and oriented to person, place, and time. Coordination: Coordination normal.      Gait: Gait normal.   Psychiatric:         Mood and Affect: Mood normal.         Behavior: Behavior normal.         Thought Content: Thought content normal.           MEDICAL DECISION MAKING Assessment/Plan:     Chasity Parham was seen today for other. Diagnoses and all orders for this visit:    Vaginal itching  -     VAGINITIS DNA PROBE; Future  -     fluconazole (DIFLUCAN) 150 MG tablet; Take 1 tablet by mouth once for 1 dose    Vaginal discharge  -     VAGINITIS DNA PROBE; Future  -     fluconazole (DIFLUCAN) 150 MG tablet; Take 1 tablet by mouth once for 1 dose    Dysuria  -     POCT Urinalysis no Micro  -     VAGINITIS DNA PROBE; Future  -     Culture, Urine; Future        Results for orders placed or performed in visit on 10/01/21   POCT Urinalysis no Micro   Result Value Ref Range    Color, UA Yellow     Clarity, UA Clear     Glucose, UA POC Negative     Bilirubin, UA Negative     Ketones, UA Negative     Spec Grav, UA >=1.030     Blood, UA POC Trace-intact     pH, UA 5.5     Protein, UA POC Negative     Urobilinogen, UA 0.2 E.U./dL     Leukocytes, UA Trace     Nitrite, UA Negative      Based on the history and exam,  Will send urine culture. - reviewed UA in office today. Will send vaginal culture. Will call with results. Will treat vaginal itching with Diflucan x1 dose. Pt to return if no improvement in symptoms. Go to the ER for any emergent concern. Patient given educational materials - see patientinstructions. Discussed use, benefit, and side effects of prescribed medications. All patient questions answered. Pt verbalized understanding. Instructed to continue current medications, diet and exercise. Patient agreed with treatment plan. Follow up as directed.      Electronically signed by MELINDA Lu CNP on 10/1/2021 at 5:02 PM

## 2021-10-02 LAB
CULTURE: NORMAL
Lab: NORMAL
SPECIMEN DESCRIPTION: NORMAL

## 2021-10-04 ENCOUNTER — TELEPHONE (OUTPATIENT)
Dept: FAMILY MEDICINE CLINIC | Age: 21
End: 2021-10-04

## 2021-10-04 DIAGNOSIS — R05.9 COUGH: Primary | ICD-10-CM

## 2021-10-04 DIAGNOSIS — R53.83 OTHER FATIGUE: ICD-10-CM

## 2021-10-04 DIAGNOSIS — N89.8 VAGINAL ITCHING: ICD-10-CM

## 2021-10-04 DIAGNOSIS — J01.00 ACUTE NON-RECURRENT MAXILLARY SINUSITIS: ICD-10-CM

## 2021-10-04 NOTE — TELEPHONE ENCOUNTER
Called pt she had covid in July 2021. She was last tested 2 weeks prior her e-visit with dr Suha Bateman on 9/14/2021 and was negative at that time. She stated she has no fever but she does have a cough.  Thank you

## 2021-10-04 NOTE — TELEPHONE ENCOUNTER
Patient called and stated that she is still not feeling well she is still having yellow mucous she loses her voice at times her lymph nodes in her neck are very swollen and large. She had just finished up the the medications that she was prescribed but she stated she is still not feeling well.  Thank you

## 2021-10-05 NOTE — TELEPHONE ENCOUNTER
Patient returned call and was informed of orders. Patient states her throat is very painful by the end of the day to the point where she loses her voice. Patient would like to know if it could be due to a lymph node or what else she could do to relieve her symptoms. Please advise, thank you!

## 2021-10-06 ENCOUNTER — HOSPITAL ENCOUNTER (OUTPATIENT)
Facility: CLINIC | Age: 21
Discharge: HOME | End: 2021-10-08
Payer: MEDICARE

## 2021-10-06 ENCOUNTER — HOSPITAL ENCOUNTER (OUTPATIENT)
Age: 21
Setting detail: SPECIMEN
Discharge: HOME | End: 2021-10-06
Payer: MEDICARE

## 2021-10-06 ENCOUNTER — HOSPITAL ENCOUNTER (OUTPATIENT)
Dept: GENERAL RADIOLOGY | Facility: CLINIC | Age: 21
Discharge: HOME | End: 2021-10-08
Payer: MEDICARE

## 2021-10-06 DIAGNOSIS — R05.9 COUGH: ICD-10-CM

## 2021-10-06 DIAGNOSIS — R53.83 OTHER FATIGUE: ICD-10-CM

## 2021-10-06 LAB
ABSOLUTE EOS #: 0.14 K/UL (ref 0–0.44)
ABSOLUTE IMMATURE GRANULOCYTE: 0.03 K/UL (ref 0–0.3)
ABSOLUTE LYMPH #: 1.66 K/UL (ref 1.1–3.7)
ABSOLUTE MONO #: 0.59 K/UL (ref 0.1–1.2)
ALBUMIN SERPL-MCNC: 4.6 G/DL (ref 3.5–5.2)
ALBUMIN/GLOBULIN RATIO: 2.3 (ref 1–2.5)
ALP BLD-CCNC: 50 U/L (ref 35–104)
ALT SERPL-CCNC: 10 U/L (ref 5–33)
ANION GAP SERPL CALCULATED.3IONS-SCNC: 14 MMOL/L (ref 9–17)
AST SERPL-CCNC: 17 U/L
BASOPHILS # BLD: 0 % (ref 0–2)
BASOPHILS ABSOLUTE: <0.03 K/UL (ref 0–0.2)
BILIRUB SERPL-MCNC: 0.34 MG/DL (ref 0.3–1.2)
BUN BLDV-MCNC: 14 MG/DL (ref 6–20)
BUN/CREAT BLD: ABNORMAL (ref 9–20)
CALCIUM SERPL-MCNC: 9 MG/DL (ref 8.6–10.4)
CHLORIDE BLD-SCNC: 104 MMOL/L (ref 98–107)
CO2: 23 MMOL/L (ref 20–31)
CREAT SERPL-MCNC: 0.82 MG/DL (ref 0.5–0.9)
DIFFERENTIAL TYPE: ABNORMAL
EOSINOPHILS RELATIVE PERCENT: 2 % (ref 1–4)
GFR AFRICAN AMERICAN: >60 ML/MIN
GFR NON-AFRICAN AMERICAN: >60 ML/MIN
GFR SERPL CREATININE-BSD FRML MDRD: ABNORMAL ML/MIN/{1.73_M2}
GFR SERPL CREATININE-BSD FRML MDRD: ABNORMAL ML/MIN/{1.73_M2}
GLUCOSE BLD-MCNC: 68 MG/DL (ref 70–99)
HCT VFR BLD CALC: 38.3 % (ref 36.3–47.1)
HEMOGLOBIN: 12.1 G/DL (ref 11.9–15.1)
IMMATURE GRANULOCYTES: 0 %
LYMPHOCYTES # BLD: 18 % (ref 24–43)
MCH RBC QN AUTO: 30 PG (ref 25.2–33.5)
MCHC RBC AUTO-ENTMCNC: 31.6 G/DL (ref 28.4–34.8)
MCV RBC AUTO: 95 FL (ref 82.6–102.9)
MONOCYTES # BLD: 7 % (ref 3–12)
NRBC AUTOMATED: 0 PER 100 WBC
PDW BLD-RTO: 14 % (ref 11.8–14.4)
PLATELET # BLD: 279 K/UL (ref 138–453)
PLATELET ESTIMATE: ABNORMAL
PMV BLD AUTO: 12.7 FL (ref 8.1–13.5)
POTASSIUM SERPL-SCNC: 4.5 MMOL/L (ref 3.7–5.3)
RBC # BLD: 4.03 M/UL (ref 3.95–5.11)
RBC # BLD: ABNORMAL 10*6/UL
SEG NEUTROPHILS: 73 % (ref 36–65)
SEGMENTED NEUTROPHILS ABSOLUTE COUNT: 6.63 K/UL (ref 1.5–8.1)
SODIUM BLD-SCNC: 141 MMOL/L (ref 135–144)
TOTAL PROTEIN: 6.6 G/DL (ref 6.4–8.3)
TSH SERPL DL<=0.05 MIU/L-ACNC: 1.01 MIU/L (ref 0.3–5)
WBC # BLD: 9.1 K/UL (ref 3.5–11.3)
WBC # BLD: ABNORMAL 10*3/UL

## 2021-10-06 PROCEDURE — 71046 X-RAY EXAM CHEST 2 VIEWS: CPT

## 2021-10-06 NOTE — TELEPHONE ENCOUNTER
Salt water gargles, sometimes we'll use a steroid, she should take tylenol/ibuprofen and suck on cough drops throughout the day

## 2021-10-08 RX ORDER — PREDNISONE 20 MG/1
20 TABLET ORAL 2 TIMES DAILY
Qty: 10 TABLET | Refills: 0 | Status: SHIPPED | OUTPATIENT
Start: 2021-10-08 | End: 2021-10-13

## 2021-10-08 RX ORDER — AMOXICILLIN AND CLAVULANATE POTASSIUM 875; 125 MG/1; MG/1
1 TABLET, FILM COATED ORAL 2 TIMES DAILY
Qty: 14 TABLET | Refills: 0 | Status: SHIPPED | OUTPATIENT
Start: 2021-10-08 | End: 2021-10-15

## 2021-10-08 NOTE — TELEPHONE ENCOUNTER
Pt notified she states she has been doing all of that.  Pt used all medications dr. Nunu Mercer provided her and was doing better and as soon and she stopped everything came right back

## 2021-10-08 NOTE — TELEPHONE ENCOUNTER
We can extend antibiotics and do a quick steroid burst again. If patient wants in person appointment she needs to go to urgent care.

## 2021-10-11 RX ORDER — FLUCONAZOLE 150 MG/1
150 TABLET ORAL
Qty: 2 TABLET | Refills: 0 | Status: SHIPPED | OUTPATIENT
Start: 2021-10-11 | End: 2021-10-17

## 2021-10-20 ENCOUNTER — OFFICE VISIT (OUTPATIENT)
Dept: FAMILY MEDICINE CLINIC | Age: 21
End: 2021-10-20
Payer: MEDICARE

## 2021-10-20 ENCOUNTER — HOSPITAL ENCOUNTER (OUTPATIENT)
Age: 21
Setting detail: SPECIMEN
Discharge: HOME | End: 2021-10-20
Payer: MEDICARE

## 2021-10-20 VITALS
HEART RATE: 68 BPM | WEIGHT: 145 LBS | SYSTOLIC BLOOD PRESSURE: 117 MMHG | RESPIRATION RATE: 12 BRPM | BODY MASS INDEX: 22.71 KG/M2 | OXYGEN SATURATION: 100 % | TEMPERATURE: 99.5 F | DIASTOLIC BLOOD PRESSURE: 74 MMHG

## 2021-10-20 DIAGNOSIS — R09.81 SINUS CONGESTION: ICD-10-CM

## 2021-10-20 DIAGNOSIS — Z86.16 HISTORY OF COVID-19: ICD-10-CM

## 2021-10-20 DIAGNOSIS — J02.9 SORE THROAT: ICD-10-CM

## 2021-10-20 DIAGNOSIS — R59.0 ENLARGED LYMPH NODE IN NECK: ICD-10-CM

## 2021-10-20 DIAGNOSIS — J02.9 SORE THROAT: Primary | ICD-10-CM

## 2021-10-20 LAB — S PYO AG THROAT QL: NORMAL

## 2021-10-20 PROCEDURE — G8427 DOCREV CUR MEDS BY ELIG CLIN: HCPCS | Performed by: NURSE PRACTITIONER

## 2021-10-20 PROCEDURE — G8484 FLU IMMUNIZE NO ADMIN: HCPCS | Performed by: NURSE PRACTITIONER

## 2021-10-20 PROCEDURE — 87880 STREP A ASSAY W/OPTIC: CPT | Performed by: NURSE PRACTITIONER

## 2021-10-20 PROCEDURE — 99214 OFFICE O/P EST MOD 30 MIN: CPT | Performed by: NURSE PRACTITIONER

## 2021-10-20 PROCEDURE — G8420 CALC BMI NORM PARAMETERS: HCPCS | Performed by: NURSE PRACTITIONER

## 2021-10-20 PROCEDURE — 1036F TOBACCO NON-USER: CPT | Performed by: NURSE PRACTITIONER

## 2021-10-20 ASSESSMENT — ENCOUNTER SYMPTOMS
RHINORRHEA: 0
EYE PAIN: 0
VOMITING: 0
CHEST TIGHTNESS: 0
SORE THROAT: 1
NAUSEA: 0
COUGH: 1
SHORTNESS OF BREATH: 0

## 2021-10-20 NOTE — PROGRESS NOTES
[address] WALK-IN  [facility] [address] [facility] [place] [id number]  Dept: [phone]  Dept Fax: [phone]    Arlene Russell is a 24 y.o. female who presents today for her medical conditions/complaints of   Chief Complaint   Patient presents with    Cough     ongoing since pt had covid in July.  Nasal Congestion    Hoarse     since + covid in july. HPI:     /74 (Site: Left Upper Arm, Position: Sitting, Cuff Size: Medium Adult)   Pulse 68   Temp 99.5 °F (37.5 °C) (Temporal)   Resp 12   Wt 145 lb (65.8 kg)   SpO2 100%   BMI 22.71 kg/m²       HPI  Pt presented to the clinic today with c/o nasal congestion. This is a new problem. The current episode started several months ago. The problem has been unchanged since onset. Associated symptoms include: cough- mostly clear, raspy/hoarse voice, sore throat, enlarged lymph nodes in neck, fatigue. Symptoms started after having COVID in July. Pertinent negatives include: No fever, chills, SOB, chest pain, abdominal pain. Pt has tried Flonase, Mucinex with some improvement. Vaccinated for COVID:  NO  History of COVID:  YES July  Exposure to COVID:  NO  Had CXR on 10/6/2021- reviewed and was unremarkable. 10/6/21 CBC - WBC 9.1  12/1/38.3 H/H  TSH 1.01  CMP- unremarkable. No past medical history on file.      Past Surgical History:   Procedure Laterality Date    KNEE ARTHROSCOPY Left 2021    With Dr. Jenny Johnson fat pad debridement    WISDOM TOOTH EXTRACTION         Family History   Problem Relation Age of Onset   [facility] Breast Cancer Mother         diagnosed at 29years old   [facility] Diabetes Father     Obesity Father     Asthma Father     Breast Cancer Maternal Grandmother     Heart Disease Maternal Grandfather     Diabetes Maternal Grandfather     Parkinsonism Maternal Grandfather     No Known Problems Paternal Grandmother     Stroke Paternal Grandfather     Cancer Maternal Uncle         throat  Diabetes Maternal Uncle        Social History     Tobacco Use    Smoking status: Never Smoker    Smokeless tobacco: Never Used   Substance Use Topics    Alcohol use: Yes        Prior to Visit Medications    Medication Sig Taking? Authorizing Provider   albuterol sulfate HFA (VENTOLIN HFA) 108 (90 Base) MCG/ACT inhaler Inhale 2 puffs into the lungs 4 times daily as needed for Wheezing  Curly Haskins MD   fluticasone (FLONASE) 50 MCG/ACT nasal spray 1 spray by Each Nostril route daily  Curly Haskins MD   amoxicillin-clavulanate (AUGMENTIN) 875-125 MG per tablet Take 1 tablet by mouth 2 times daily for 14 days  Curly Haskins MD   predniSONE (DELTASONE) 20 MG tablet Take 1 tablet by mouth 2 times daily for 5 days  Curly Haskins MD   ibuprofen (IBU) 600 MG tablet Take 1 tablet by mouth every 6 hours as needed for Pain  Chelo Willams MD   norethindrone-ethinyl estradiol-iron (Roosvelt Oralia FE1.5/30) 1.5-30 MG-MCG tablet Take 1 tablet by mouth daily  Historical Provider, MD       No Known Allergies      Subjective:      Review of Systems   Constitutional: Positive for fatigue. Negative for chills and fever. HENT: Positive for congestion and sore throat. Negative for ear pain and rhinorrhea. Eyes: Negative for pain and visual disturbance. Respiratory: Positive for cough. Negative for chest tightness and shortness of breath. Cardiovascular: Negative for chest pain, palpitations and leg swelling. Gastrointestinal: Negative for nausea and vomiting. Genitourinary: Negative for decreased urine volume and difficulty urinating. Musculoskeletal: Negative for gait problem, myalgias and neck pain. Skin: Negative for pallor and rash. Neurological: Negative for weakness, light-headedness and headaches. Hematological: Positive for adenopathy. Psychiatric/Behavioral: Negative for sleep disturbance. Objective:     Physical Exam  Vitals and nursing note reviewed.    Constitutional:       General: She is SOFT TISSUE THYROID; Future  -     Strep A DNA probe, amplification; Future    Sinus congestion    History of COVID-19        Results for orders placed or performed in visit on 10/20/21   POCT rapid strep A   Result Value Ref Range    Strep A Ag None Detected None Detected     Rapid strep A negative in the office today. Will send out strep DNA to lab. Will call with results and further orders if needed. Will check US due to enlarged lymph nodes in neck - right greater then left. Plan follow up with PCP. Go to the ER for any emergent concern. Preventing the Spread of Coronavirus Disease 2019 in Homes and Residential Communities: For the most recent information go to: [employer]    Patient given educational materials - see patientinstructions. Discussed use, benefit, and side effects of prescribed medications. All patient questions answered. Pt verbalized understanding. Instructed to continue current medications, diet and exercise. Patient agreed with treatment plan. Follow up as directed.      Electronically signed by MELINDA Brooke CNP on 10/20/2021 at 5:56 PM

## 2021-10-20 NOTE — PATIENT INSTRUCTIONS
Patient Education        Swollen Lymph Nodes: Care Instructions  Your Care Instructions     Lymph nodes are small, bean-shaped glands throughout the body. They help your body fight germs and infections. Lymph nodes often swell when there is a problem such as an injury, infection, or tumor. · The nodes in your neck, under your chin, or behind your ears may swell when you have a cold or sore throat. · An injury or infection in a leg or foot can make the nodes in your groin swell. · Sometimes medicine can make lymph nodes swell, but this is rare. Treatment depends on what caused your nodes to swell. Usually the nodes return to normal size without a problem. Follow-up care is a key part of your treatment and safety. Be sure to make and go to all appointments, and call your doctor if you are having problems. It's also a good idea to know your test results and keep a list of the medicines you take. How can you care for yourself at home? · Take your medicines exactly as prescribed. Call your doctor if you think you are having a problem with your medicine. · Avoid irritation. ? Do not squeeze or pick at the lump. ? Do not stick a needle in it. · Prevent infection. Do not squeeze, drain, or puncture a painful lump. Doing this can irritate or inflame the lump, push any existing infection deeper into the skin, or cause severe bleeding. · Get extra rest. Slow down just a little from your usual routine. · Drink plenty of fluids. If you have kidney, heart, or liver disease and have to limit fluids, talk with your doctor before you increase the amount of fluids you drink. · Take an over-the-counter pain medicine, such as acetaminophen (Tylenol), ibuprofen (Advil, Motrin), or naproxen (Aleve). Read and follow all instructions on the label. · Do not take two or more pain medicines at the same time unless the doctor told you to. Many pain medicines have acetaminophen, which is Tylenol.  Too much acetaminophen (Tylenol) can be harmful. When should you call for help? Call your doctor now or seek immediate medical care if:    · You have worse symptoms of infection, such as:  ? Increased pain, swelling, warmth, or redness. ? Red streaks leading from the area. ? Pus draining from the area. ? A fever. Watch closely for changes in your health, and be sure to contact your doctor if:    · Your lymph nodes do not get smaller or do not return to normal.     · You do not get better as expected. Where can you learn more? Go to [email or web address]://[email or web address]. org and sign in to your [employer] account. Enter V139 in the SwipeClock box to learn more about \"Swollen Lymph Nodes: Care Instructions. \"     If you do not have an account, please click on the \"Sign Up Now\" link. Current as of: July 1, 2021               Content Version: 13.0  © 2006-2021 [employer]. Care instructions adapted under license by [facility] [address] If you have questions about a medical condition or this instruction, always ask your healthcare professional. [place] [address] any warranty or liability for your use of this information. Patient Education        Sore Throat: Care Instructions  Your Care Instructions     Infection by bacteria or a virus causes most sore throats. Cigarette smoke, dry air, air pollution, allergies, and yelling can also cause a sore throat. Sore throats can be painful and annoying. Fortunately, most sore throats go away on their own. If you have a bacterial infection, your doctor may prescribe antibiotics. Follow-up care is a key part of your treatment and safety. Be sure to make and go to all appointments, and call your doctor if you are having problems. It's also a good idea to know your test results and keep a list of the medicines you take. How can you care for yourself at home? · If your doctor prescribed antibiotics, take them as directed.  Do not stop taking them just because you feel better. You need to take the full course of antibiotics. · Gargle with warm salt water once an hour to help reduce swelling and relieve discomfort. Use 1 teaspoon of salt mixed in 1 cup of warm water. · Take an over-the-counter pain medicine, such as acetaminophen (Tylenol), ibuprofen (Advil, Motrin), or naproxen (Aleve). Read and follow all instructions on the label. · Be careful when taking over-the-counter cold or flu medicines and Tylenol at the same time. Many of these medicines have acetaminophen, which is Tylenol. Read the labels to make sure that you are not taking more than the recommended dose. Too much acetaminophen (Tylenol) can be harmful. · Drink plenty of fluids. Fluids may help soothe an irritated throat. Hot fluids, such as tea or soup, may help decrease throat pain. · Use over-the-counter throat lozenges to soothe pain. Regular cough drops or hard candy may also help. These should not be given to young children because of the risk of choking. · Do not smoke or allow others to smoke around you. If you need help quitting, talk to your doctor about stop-smoking programs and medicines. These can increase your chances of quitting for good. · Use a vaporizer or humidifier to add moisture to your bedroom. Follow the directions for cleaning the machine. When should you call for help? Call your doctor now or seek immediate medical care if:    · You have new or worse trouble swallowing.     · Your sore throat gets much worse on one side. Watch closely for changes in your health, and be sure to contact your doctor if you do not get better as expected. Where can you learn more? Go to [email or web address]://Savellievan.Winkapp. org and sign in to your [employer] account. Enter T299 in the [employer] box to learn more about \"Sore Throat: Care Instructions. \"     If you do not have an account, please click on the \"Sign Up Now\" link.   Current as of: December 2, 2020               Content Version: 13.0  © 1630-8798 Healthwise, Incorporated. Care instructions adapted under license by [facility] ([facility]). If you have questions about a medical condition or this instruction, always ask your healthcare professional. [address] any warranty or liability for your use of this information.

## 2021-10-21 ENCOUNTER — HOSPITAL ENCOUNTER (OUTPATIENT)
Dept: ULTRASOUND IMAGING | Age: 21
Discharge: HOME | End: 2021-10-23
Payer: MEDICARE

## 2021-10-21 DIAGNOSIS — R59.0 ENLARGED LYMPH NODE IN NECK: ICD-10-CM

## 2021-10-21 LAB
DIRECT EXAM: NORMAL
Lab: NORMAL
SPECIMEN DESCRIPTION: NORMAL

## 2021-10-21 PROCEDURE — 76536 US EXAM OF HEAD AND NECK: CPT

## 2021-10-28 ENCOUNTER — OFFICE VISIT (OUTPATIENT)
Dept: PRIMARY CARE CLINIC | Age: 21
End: 2021-10-28
Payer: MEDICARE

## 2021-10-28 ENCOUNTER — HOSPITAL ENCOUNTER (OUTPATIENT)
Age: 21
Setting detail: SPECIMEN
Discharge: HOME | End: 2021-10-28
Payer: MEDICARE

## 2021-10-28 VITALS
RESPIRATION RATE: 15 BRPM | HEART RATE: 103 BPM | WEIGHT: 140 LBS | OXYGEN SATURATION: 98 % | SYSTOLIC BLOOD PRESSURE: 124 MMHG | DIASTOLIC BLOOD PRESSURE: 80 MMHG | HEIGHT: 67 IN | BODY MASS INDEX: 21.97 KG/M2

## 2021-10-28 DIAGNOSIS — R59.1 LYMPHADENOPATHY: ICD-10-CM

## 2021-10-28 DIAGNOSIS — J02.9 PHARYNGITIS, UNSPECIFIED ETIOLOGY: ICD-10-CM

## 2021-10-28 DIAGNOSIS — R09.81 SINUS CONGESTION: ICD-10-CM

## 2021-10-28 DIAGNOSIS — R53.83 FATIGUE, UNSPECIFIED TYPE: Primary | ICD-10-CM

## 2021-10-28 DIAGNOSIS — R53.83 FATIGUE, UNSPECIFIED TYPE: ICD-10-CM

## 2021-10-28 PROBLEM — N12 PYELONEPHRITIS: Status: RESOLVED | Noted: 2018-11-16 | Resolved: 2021-10-28

## 2021-10-28 PROBLEM — D72.829 LEUKOCYTOSIS: Status: RESOLVED | Noted: 2018-11-16 | Resolved: 2021-10-28

## 2021-10-28 PROCEDURE — 1036F TOBACCO NON-USER: CPT | Performed by: NURSE PRACTITIONER

## 2021-10-28 PROCEDURE — G8427 DOCREV CUR MEDS BY ELIG CLIN: HCPCS | Performed by: NURSE PRACTITIONER

## 2021-10-28 PROCEDURE — 99204 OFFICE O/P NEW MOD 45 MIN: CPT | Performed by: NURSE PRACTITIONER

## 2021-10-28 PROCEDURE — G8420 CALC BMI NORM PARAMETERS: HCPCS | Performed by: NURSE PRACTITIONER

## 2021-10-28 PROCEDURE — G8484 FLU IMMUNIZE NO ADMIN: HCPCS | Performed by: NURSE PRACTITIONER

## 2021-10-28 RX ORDER — LORATADINE 10 MG/1
10 TABLET ORAL DAILY
Qty: 30 TABLET | Refills: 3 | Status: SHIPPED | OUTPATIENT
Start: 2021-10-28

## 2021-10-28 RX ORDER — DOXYCYCLINE HYCLATE 100 MG
100 TABLET ORAL 2 TIMES DAILY
Qty: 20 TABLET | Refills: 0 | Status: SHIPPED | OUTPATIENT
Start: 2021-10-28 | End: 2021-11-07

## 2021-10-28 RX ORDER — ETONOGESTREL 68 MG/1
68 IMPLANT SUBCUTANEOUS ONCE
COMMUNITY

## 2021-10-28 ASSESSMENT — ENCOUNTER SYMPTOMS
SORE THROAT: 1
NAUSEA: 0
DIARRHEA: 0
ABDOMINAL PAIN: 0
COLOR CHANGE: 0
SHORTNESS OF BREATH: 0
SINUS PAIN: 1
CHEST TIGHTNESS: 0
RHINORRHEA: 0
VOMITING: 0

## 2021-10-28 NOTE — PROGRESS NOTES
[address] PRIMARY CARE  [address] [facility] [address]  [address] [address]  Dept: [phone]  Dept Fax: [phone]    Leora Lowe is a 24 y.o. female who presentstoday for her medical conditions/complaints as noted below. Leora Lowe is c/o of  Chief Complaint   Patient presents with   [address]     F/U to US-Neck 10/21/21(in system)    Congestion     Post-Covid (2021-dx)         HPI:     Here to establish as new patient  Has current complaint of lymphadenopathy, nasal congestion, intermittent ear pain, pharyngitis. Had Covid in July and recovered at home. Since then has had several sinus infections for which she was treated for using Augmentin, reports minimal improvement with this. No history of seasonal allergies but as she has gotten older she has recurrent sinus congestion. Has history of mono x2 in childhood, not recently tested. Also complains of extreme fatigue, recent labs reviewed-had normal thyroid, no leukocytosis, no fever or chills. Ultrasound head/neck positive for reactive lymph nodes bilaterally. She reports that she has had enlarged lymph nodes on left side since having Covid in July but all other enlarged lymph nodes are new in the last few weeks. Denies any difficulty swallowing or breathing, occasionally has hoarseness. Was prescribed Augmentin and steroid 10/20 at walk-in, strep negative, did not take prescribed medications, wanted to wait to treat until she saw new provider.     No other concerns or complaints       No results found for: LABA1C          ( goal A1C is < 7)   No results found for: LABMICR  LDL Calculated (mg/dL)   Date Value   2018 46       (goal LDL is <100)   AST (U/L)   Date Value   10/06/2021 17     ALT (U/L)   Date Value   10/06/2021 10     BUN (mg/dL)   Date Value   10/06/2021 14     BP Readings from Last 3 Encounters:   10/28/21 124/80   10/20/21 117/74   10/01/21 119/71          (abxi590/80)    Past Medical History:   Diagnosis Date    Leukocytosis 11/16/2018    Pyelonephritis 11/16/2018      Past Surgical History:   Procedure Laterality Date    KNEE ARTHROSCOPY Left 2021    With Dr. Diamond Casarez fat pad debridement    WISDOM TOOTH EXTRACTION         Family History   Problem Relation Age of Onset   Reyna Talley Breast Cancer Mother         diagnosed at 29years old   Reyna Talley Diabetes Father     Obesity Father     Asthma Father     Breast Cancer Maternal Grandmother     Heart Disease Maternal Grandfather     Diabetes Maternal Grandfather     Parkinsonism Maternal Grandfather     No Known Problems Paternal Grandmother     Stroke Paternal Grandfather     Cancer Maternal Uncle         throat    Diabetes Maternal Uncle        Social History     Tobacco Use    Smoking status: Never Smoker    Smokeless tobacco: Never Used   Substance Use Topics    Alcohol use: Yes      Current Outpatient Medications   Medication Sig Dispense Refill    etonogestrel (NEXPLANON) 68 MG implant 68 mg by Subdermal route once      doxycycline hyclate (VIBRA-TABS) 100 MG tablet Take 1 tablet by mouth 2 times daily for 10 days 20 tablet 0    loratadine (CLARITIN) 10 MG tablet Take 1 tablet by mouth daily 30 tablet 3    albuterol sulfate HFA (VENTOLIN HFA) 108 (90 Base) MCG/ACT inhaler Inhale 2 puffs into the lungs 4 times daily as needed for Wheezing 18 g 0    fluticasone (FLONASE) 50 MCG/ACT nasal spray 1 spray by Each Nostril route daily 32 g 1    ibuprofen (IBU) 600 MG tablet Take 1 tablet by mouth every 6 hours as needed for Pain 20 tablet 0     Current Facility-Administered Medications   Medication Dose Route Frequency Provider Last Rate Last Admin    lidocaine-EPINEPHrine 1 percent-1:312443 injection 0.5 mL  0.5 mL IntraDERmal Once Roberto Farrell MD         No Known Allergies    Health Maintenance   Topic Date Due    Chlamydia screen  Never done    HPV vaccine (3 - 3-dose series) 12/28/2015    Pap smear  Never done   Reynalaura Orellanaig COVID-19 Vaccine (1) 10/28/2021 (Originally 5/11/2011)    Hepatitis A vaccine (2 of 2 - 2-dose series) 11/11/2021 (Originally 3/5/2018)    Flu vaccine (1) 10/28/2022 (Originally 9/1/2021)    DTaP/Tdap/Td vaccine (9 - Td or Tdap) 09/16/2030    Hepatitis B vaccine  Completed    Hib vaccine  Completed    Varicella vaccine  Completed    Meningococcal (ACWY) vaccine  Completed    HIV screen  Completed    Pneumococcal 0-64 years Vaccine  Aged Out    Hepatitis C screen  Discontinued       Subjective:      Review of Systems   Constitutional: Negative for activity change, fatigue and fever. HENT: Positive for congestion, ear pain, postnasal drip, sinus pain and sore throat. Negative for rhinorrhea. Swollen lymph nodes, neck   Eyes: Negative for visual disturbance. Respiratory: Negative for chest tightness and shortness of breath. Cardiovascular: Negative for chest pain and palpitations. Gastrointestinal: Negative for abdominal pain, diarrhea, nausea and vomiting. Endocrine: Negative for polydipsia. Genitourinary: Negative for difficulty urinating. Musculoskeletal: Negative for arthralgias and myalgias. Skin: Negative for color change. Neurological: Negative for weakness and headaches. Psychiatric/Behavioral: Negative for behavioral problems. The patient is not nervous/anxious. All other systems reviewed and are negative. Objective:   /80   Pulse 103   Resp 15   Ht 5' 7\" (1.702 m)   Wt 140 lb (63.5 kg)   SpO2 98%   BMI 21.93 kg/m²   Physical Exam  Vitals reviewed. Constitutional:       General: She is not in acute distress. Appearance: Normal appearance. HENT:      Head: Normocephalic. Right Ear: A middle ear effusion (serous) is present. Left Ear: A middle ear effusion (serous) is present. Nose:      Right Sinus: Maxillary sinus tenderness and frontal sinus tenderness present.       Left Sinus: Maxillary sinus tenderness and frontal sinus Panel; Future  - doxycycline hyclate (VIBRA-TABS) 100 MG tablet; Take 1 tablet by mouth 2 times daily for 10 days  Dispense: 20 tablet; Refill: 0    Return in about 10 days (around 11/7/2021) for sinus congestion . Patient given educational materials - see patient instructions. Discussed use, benefit, and side effects of prescribed medications. All patient questions answered. Pt voiced understanding. Reviewed health maintenance. Instructed to continue current medications, diet and exercise. Patient agreed with treatment plan. Follow up as directed.        Electronicallysigned by MELINDA Rizo CNP on 10/28/2021 at 2:42 PM

## 2021-10-28 NOTE — PATIENT INSTRUCTIONS
Patient Education        Saline Nasal Washes: Care Instructions  Your Care Instructions     Saline nasal washes help keep the nasal passages open by washing out thick or dried mucus. This simple remedy can help relieve symptoms of allergies, sinusitis, and colds. It also can make the nose feel more comfortable by keeping the mucous membranes moist. You may notice a little burning sensation in your nose the first few times you use the solution, but this usually gets better in a few days. Follow-up care is a key part of your treatment and safety. Be sure to make and go to all appointments, and call your doctor if you are having problems. It's also a good idea to know your test results and keep a list of the medicines you take. How can you care for yourself at home? · You can buy premixed saline solution in a squeeze bottle or other sinus rinse products at a drugstore. Read and follow the instructions on the label. · You also can make your own saline solution by adding 1 teaspoon of salt and 1 teaspoon of baking soda to 2 cups of distilled water. · If you use a homemade solution, pour a small amount into a clean bowl. Using a rubber bulb syringe, squeeze the syringe and place the tip in the salt water. Pull a small amount of the salt water into the syringe by relaxing your hand. · Sit down with your head tilted slightly back. Do not lie down. Put the tip of the bulb syringe or the squeeze bottle a little way into one of your nostrils. Gently drip or squirt a few drops into the nostril. Repeat with the other nostril. Some sneezing and gagging are normal at first.  · Gently blow your nose. · Wipe the syringe or bottle tip clean after each use. · Repeat this 2 or 3 times a day. · Use nasal washes gently if you have nosebleeds often. When should you call for help?   Watch closely for changes in your health, and be sure to contact your doctor if:    · You often get nosebleeds.     · You have problems doing the nasal washes. Where can you learn more? Go to [email or web address] and sign in to your Personetahart account. Enter [phone] [id number] [phone] in the [facility] box to learn more about \"Saline Nasal Washes: Care Instructions. \"     If you do not have an account, please click on the \"Sign Up Now\" link. Current as of: December 2, 2020               Content Version: 13.0  © 2006-2021 Healthwise, Incorporated. Care instructions adapted under license by [facility] ([facility]). If you have questions about a medical condition or this instruction, always ask your healthcare professional. [address] any warranty or liability for your use of this information.

## 2021-10-31 LAB
EBV EARLY ANTIGEN AB, IGG: 5.6 U/ML (ref 0–10.9)
EBV NUCLEAR AG AB: 309 U/ML (ref 0–21.9)
EPSTEIN-BARR VCA IGG: 41.1 U/ML (ref 0–21.9)
EPSTEIN-BARR VCA IGM: <10 U/ML (ref 0–43.9)

## 2021-11-01 DIAGNOSIS — Z86.16 PERSONAL HISTORY OF COVID-19: ICD-10-CM

## 2021-11-01 DIAGNOSIS — R59.1 LYMPHADENOPATHY: Primary | ICD-10-CM

## 2021-11-01 DIAGNOSIS — U09.9 POST-COVID-19 CONDITION: ICD-10-CM

## 2021-11-08 ENCOUNTER — OFFICE VISIT (OUTPATIENT)
Dept: PRIMARY CARE CLINIC | Age: 21
End: 2021-11-08
Payer: MEDICARE

## 2021-11-08 VITALS
RESPIRATION RATE: 16 BRPM | DIASTOLIC BLOOD PRESSURE: 80 MMHG | OXYGEN SATURATION: 99 % | SYSTOLIC BLOOD PRESSURE: 122 MMHG | BODY MASS INDEX: 22.22 KG/M2 | WEIGHT: 141.6 LBS | HEIGHT: 67 IN | HEART RATE: 75 BPM

## 2021-11-08 DIAGNOSIS — R09.81 SINUS CONGESTION: ICD-10-CM

## 2021-11-08 DIAGNOSIS — R59.1 LYMPHADENOPATHY: Primary | ICD-10-CM

## 2021-11-08 DIAGNOSIS — K21.9 GASTROESOPHAGEAL REFLUX DISEASE WITHOUT ESOPHAGITIS: ICD-10-CM

## 2021-11-08 PROCEDURE — G8427 DOCREV CUR MEDS BY ELIG CLIN: HCPCS | Performed by: NURSE PRACTITIONER

## 2021-11-08 PROCEDURE — 99213 OFFICE O/P EST LOW 20 MIN: CPT | Performed by: NURSE PRACTITIONER

## 2021-11-08 PROCEDURE — G8484 FLU IMMUNIZE NO ADMIN: HCPCS | Performed by: NURSE PRACTITIONER

## 2021-11-08 PROCEDURE — 1036F TOBACCO NON-USER: CPT | Performed by: NURSE PRACTITIONER

## 2021-11-08 PROCEDURE — G8420 CALC BMI NORM PARAMETERS: HCPCS | Performed by: NURSE PRACTITIONER

## 2021-11-08 RX ORDER — OMEPRAZOLE 20 MG/1
20 CAPSULE, DELAYED RELEASE ORAL
Qty: 30 CAPSULE | Refills: 3 | Status: SHIPPED | OUTPATIENT
Start: 2021-11-08

## 2021-11-08 ASSESSMENT — ENCOUNTER SYMPTOMS
COLOR CHANGE: 0
VOMITING: 0
DIARRHEA: 0
SORE THROAT: 0
RHINORRHEA: 0
NAUSEA: 0
SHORTNESS OF BREATH: 0
ABDOMINAL PAIN: 0
CHEST TIGHTNESS: 0

## 2021-11-08 NOTE — PATIENT INSTRUCTIONS
Schedule a Vaccine  When you qualify to receive the vaccine, call the [facility]) COVID-19 Vaccination Hotline to schedule your appointment or to get additional information about the [facility]) locations which are offering the COVID-19 vaccine. To be 94% effective, it's important that you receive two doses of one of the COVID-19 vaccines. -If you are receiving the Colon Peter vaccine, your second shot will be scheduled as close to 21 days after the first shot as possible. -If you are receiving the Moderna vaccine, your second shot will be scheduled as close to 28 days after the first shot as possible. [facility]) COVID-19 Vaccination Hotline: [phone]    Links to [facility]) website and [facility] website:    [employer]/[facility]-[email or web address]-[email or web address]-vaccine/[email or web address]    [email or web address] Education        Gastroesophageal Reflux Disease (GERD): Care Instructions  Overview     Gastroesophageal reflux disease (GERD) is the backward flow of stomach acid into the esophagus. The esophagus is the tube that leads from your throat to your stomach. A one-way valve prevents the stomach acid from backing up into this tube. But when you have GERD, this valve does not close tightly enough. This can also cause pain and swelling in your esophagus. (This is called esophagitis.)  If you have mild GERD symptoms including heartburn, you may be able to control the problem with antacids or over-the-counter medicine. You can also make lifestyle changes to help reduce your symptoms. These include changing your diet and eating habits, such as not eating late at night and losing weight. Follow-up care is a key part of your treatment and safety. Be sure to make and go to all appointments, and call your doctor if you are having problems. It's also a good idea to know your test results and keep a list of the medicines you take.   How can

## 2021-11-08 NOTE — PROGRESS NOTES
[address] PRIMARY CARE  [facility] [address] [facility] [address]  [address] [address]  Dept: [phone]  Dept Fax: [phone]    Ankit Das is a 24 y.o. female who presentstoday for her medical conditions/complaints as noted below. Ankit Das is c/o of  Chief Complaint   Patient presents with    Other     Sinus recheck          HPI:     Here for recheck of lymphadenopathy and sinusitis, feels improved after course of doxycycline, had not responded to augmentin prior. Cervical lymph nodes are smaller and not painful, just slight discomfort. Continues to have some mucous and intermittent cough, denies SOB. Fatigue and ear pain have improved Mono labs indicate past infection, has appt with ID. Has had autoimmune workup in past and ruled out SLE.      No other concerns or complaints       No results found for: LABA1C          ( goal A1C is < 7)   No results found for: LABMICR  LDL Calculated (mg/dL)   Date Value   2018 46       (goal LDL is <100)   AST (U/L)   Date Value   10/06/2021 17     ALT (U/L)   Date Value   10/06/2021 10     BUN (mg/dL)   Date Value   10/06/2021 14     BP Readings from Last 3 Encounters:   21 122/80   10/28/21 124/80   10/20/21 117/74          (bbrf088/80)    Past Medical History:   Diagnosis Date    Leukocytosis 2018    Pyelonephritis 2018      Past Surgical History:   Procedure Laterality Date    KNEE ARTHROSCOPY Left     With Dr. Graves Dire fat pad debridement    WISDOM TOOTH EXTRACTION         Family History   Problem Relation Age of Onset    Breast Cancer Mother         diagnosed at 29years old   Walker Diabetes Father     Obesity Father     Asthma Father     Breast Cancer Maternal Grandmother     Heart Disease Maternal Grandfather     Diabetes Maternal Grandfather     Parkinsonism Maternal Grandfather     No Known Problems Paternal Grandmother     Stroke Paternal Grandfather     Cancer Maternal Uncle Cervical lymphadenopathy   Eyes: Negative for visual disturbance. Respiratory: Negative for chest tightness and shortness of breath. Cardiovascular: Negative for chest pain and palpitations. Gastrointestinal: Negative for abdominal pain, diarrhea, nausea and vomiting. Endocrine: Negative for polydipsia. Genitourinary: Negative for difficulty urinating. Musculoskeletal: Negative for arthralgias and myalgias. Skin: Negative for color change. Neurological: Negative for weakness and headaches. Psychiatric/Behavioral: Negative for behavioral problems. The patient is not nervous/anxious. All other systems reviewed and are negative. Objective:   /80 (Site: Left Upper Arm, Position: Sitting, Cuff Size: Medium Adult)   Pulse 75   Resp 16   Ht 5' 7\" (1.702 m)   Wt 141 lb 9.6 oz (64.2 kg)   SpO2 99%   Breastfeeding No   BMI 22.18 kg/m²   Physical Exam  Vitals reviewed. Constitutional:       General: She is not in acute distress. Appearance: Normal appearance. HENT:      Head: Normocephalic. Right Ear: Tympanic membrane, ear canal and external ear normal.      Left Ear: Tympanic membrane, ear canal and external ear normal.      Nose: Congestion present. Right Sinus: No maxillary sinus tenderness or frontal sinus tenderness. Left Sinus: No maxillary sinus tenderness or frontal sinus tenderness. Mouth/Throat:      Pharynx: Oropharynx is clear. Posterior oropharyngeal erythema present. Tonsils: No tonsillar exudate. Eyes:      Pupils: Pupils are equal, round, and reactive to light. Cardiovascular:      Rate and Rhythm: Normal rate and regular rhythm. Pulses: Normal pulses. Heart sounds: Normal heart sounds. Pulmonary:      Effort: Pulmonary effort is normal.      Breath sounds: Normal breath sounds. Abdominal:      General: There is no distension. Musculoskeletal:         General: Normal range of motion. Cervical back: Neck supple. Right lower leg: No edema. Left lower leg: No edema. Lymphadenopathy:      Cervical: Cervical adenopathy (improving) present. Skin:     General: Skin is warm and dry. Capillary Refill: Capillary refill takes less than 2 seconds. Neurological:      General: No focal deficit present. Mental Status: She is alert and oriented to person, place, and time. Psychiatric:         Mood and Affect: Mood normal.         Behavior: Behavior normal.           :       Diagnosis Orders   1. Lymphadenopathy     2. Sinus congestion     3. Gastroesophageal reflux disease without esophagitis  omeprazole (PRILOSEC) 20 MG delayed release capsule             :          1. Lymphadenopathy  2. Sinus congestion  Improving after course of doxy. Labs indicate past mono infection, no recent infection. Continue daily antihistamine, saline sinus rinse and warm salt water gargles as needed. May take several weeks for lymphadenopathy to resolve, f/u with ID as planned for recurrent s/s infection. Hx autoimmune workup negative. 3. Gastroesophageal reflux disease without esophagitis  Trial PPI for voice hoarseness, has occasional heartburn symptoms. Discussed antireflux lifestyle modifications. - omeprazole (PRILOSEC) 20 MG delayed release capsule; Take 1 capsule by mouth every morning (before breakfast)  Dispense: 30 capsule; Refill: 3      Return if symptoms worsen or fail to improve. Patient given educational materials - see patient instructions. Discussed use, benefit, and side effects of prescribed medications. All patient questions answered. Pt voiced understanding. Reviewed health maintenance. Instructed to continue current medications, diet and exercise. Patient agreed with treatment plan. Follow up as directed.        Electronicallysigned by MELINDA Ivey CNP on 11/8/2021 at 6:25 PM

## 2021-11-17 ENCOUNTER — HOSPITAL ENCOUNTER (OUTPATIENT)
Age: 21
Discharge: HOME | End: 2021-11-17
Payer: MEDICARE

## 2021-11-17 ENCOUNTER — OFFICE VISIT (OUTPATIENT)
Dept: INFECTIOUS DISEASES | Age: 21
End: 2021-11-17
Payer: MEDICARE

## 2021-11-17 VITALS
OXYGEN SATURATION: 97 % | RESPIRATION RATE: 18 BRPM | WEIGHT: 139 LBS | SYSTOLIC BLOOD PRESSURE: 107 MMHG | HEART RATE: 97 BPM | TEMPERATURE: 97.3 F | BODY MASS INDEX: 21.82 KG/M2 | HEIGHT: 67 IN | DIASTOLIC BLOOD PRESSURE: 67 MMHG

## 2021-11-17 DIAGNOSIS — J01.00 ACUTE NON-RECURRENT MAXILLARY SINUSITIS: Primary | ICD-10-CM

## 2021-11-17 DIAGNOSIS — J01.40 ACUTE NON-RECURRENT PANSINUSITIS: ICD-10-CM

## 2021-11-17 LAB
HCG(URINE) PREGNANCY TEST: NEGATIVE
HCG, PREGNANCY URINE (POC): ABNORMAL

## 2021-11-17 PROCEDURE — G8484 FLU IMMUNIZE NO ADMIN: HCPCS | Performed by: INTERNAL MEDICINE

## 2021-11-17 PROCEDURE — 81025 URINE PREGNANCY TEST: CPT | Performed by: INTERNAL MEDICINE

## 2021-11-17 PROCEDURE — 81025 URINE PREGNANCY TEST: CPT

## 2021-11-17 PROCEDURE — G8420 CALC BMI NORM PARAMETERS: HCPCS | Performed by: INTERNAL MEDICINE

## 2021-11-17 PROCEDURE — G8428 CUR MEDS NOT DOCUMENT: HCPCS | Performed by: INTERNAL MEDICINE

## 2021-11-17 PROCEDURE — 1036F TOBACCO NON-USER: CPT | Performed by: INTERNAL MEDICINE

## 2021-11-17 PROCEDURE — 99205 OFFICE O/P NEW HI 60 MIN: CPT | Performed by: INTERNAL MEDICINE

## 2021-11-17 RX ORDER — DOXYCYCLINE HYCLATE 100 MG
100 TABLET ORAL 2 TIMES DAILY
Qty: 28 TABLET | Refills: 0 | Status: SHIPPED | OUTPATIENT
Start: 2021-11-17 | End: 2021-12-01

## 2021-11-17 RX ORDER — PENICILLIN V POTASSIUM 500 MG/1
TABLET ORAL
COMMUNITY
Start: 2021-11-15

## 2021-11-17 RX ORDER — GUAIFENESIN 100 MG/5ML
400 SYRUP ORAL 3 TIMES DAILY PRN
Qty: 1 EACH | Refills: 2 | Status: SHIPPED | OUTPATIENT
Start: 2021-11-17 | End: 2021-12-01

## 2021-11-17 RX ORDER — IBUPROFEN 800 MG/1
TABLET ORAL
COMMUNITY
Start: 2021-11-15

## 2021-11-17 ASSESSMENT — ENCOUNTER SYMPTOMS
ABDOMINAL DISTENTION: 0
COLOR CHANGE: 0
EYE DISCHARGE: 0
COUGH: 1
SORE THROAT: 1
SINUS PRESSURE: 1
VOICE CHANGE: 1
APNEA: 0

## 2021-11-17 NOTE — PROGRESS NOTES
[facility] - Initial Consult Note  Today's Date: 11/17/2021    Impression :   New 11/17/21  · covid July 2021   · post covid sinusititis:  · still has hoarse and persistent PND and sinusitis  · Nasal yellow secretions all the time, eyes crusty  · Failed augmentin and doxy ( 1 week, ended 11/14/21) helped  The eyes and the secretions and PND and fluid in ears  · 11/17/21-- still hoarse and still has cough now clear since the doxy, and saliva thick  · Given anti allergies  · flonase x 8/2021    Recommendations   · Hydrate +++, will help saliva, energy  · expectorant cough med, robitussin as needed  · Resume 2 more weeks doxy ( total 3 weeks)  · Keep flonase  · See me in 3 weeks  · Pregnancy test - last intercourse 2 months ago   · Nasal saline bid very helkpful   Diagnosis Orders   1. Acute non-recurrent maxillary sinusitis  doxycycline hyclate (VIBRA-TABS) 100 MG tablet    guaiFENesin (ALTARUSSIN) 100 MG/5ML syrup    POC Pregnancy Urine Qual   2. Acute non-recurrent pansinusitis  doxycycline hyclate (VIBRA-TABS) 100 MG tablet    guaiFENesin (ALTARUSSIN) 100 MG/5ML syrup    POC Pregnancy Urine Qual       Return in about 3 weeks (around 12/8/2021).       History of Present Illness:   Pretty Torres is a 24y.o.-year-old  female who presents with   Chief Complaint   Patient presents with    Lymphadenopathy     New patient    new pt 11/17/21  · covid July 2021   · post covid sinusititis:  · still has hoarse and persistent PND and sinusitis and bilat serous otitis media  · Nasal yellow secretions all the time, eyes crusty  · Failed augmentin and doxy ( 1 week, ended 11/14/21) helped  The eyes and the secretions and PND and fluid in ears  · 11/17/21-- still hoarse and still has cough now clear since the doxy, and saliva thick  · Left ear still some clear fludi and sinuses not tender  · Given anti allergies  · flonase x 8/2021    · plan  Hydrate +++, will help saliva, energy  · expectorant cough med, robitussin as needed  · Resume 2 more weeks doxy ( total 3 weeks)  · Keep flonase  · See me in 3 weeks  · Pregnancy test before more doxy is started- pt instructed - last intercourse 2 months ago   · Nasal saline bid very helkpful      I have personally reviewed the past medical history, past surgical history, medications, social history, and family history, and I haveupdated the database accordingly.   Past Medical History:     Past Medical History:   Diagnosis Date    Leukocytosis 11/16/2018    Pyelonephritis 11/16/2018       Past Surgical  History:     Past Surgical History:   Procedure Laterality Date    KNEE ARTHROSCOPY Left 2021    With Dr. Hayder Diez fat pad debridement    WISDOM TOOTH EXTRACTION         Medications:     Current Outpatient Medications:     doxycycline hyclate (VIBRA-TABS) 100 MG tablet, Take 1 tablet by mouth 2 times daily for 14 days, Disp: 28 tablet, Rfl: 0    guaiFENesin (ALTARUSSIN) 100 MG/5ML syrup, Take 20 mLs by mouth 3 times daily as needed for Cough or Congestion, Disp: 1 each, Rfl: 2    ibuprofen (ADVIL;MOTRIN) 800 MG tablet, TAKE 1 TABLET BY MOUTH EVERY SIX TO EIGHT HOURS AS NEEDED FOR PAIN OR SWELLING, TAKE WITH FOOD., Disp: , Rfl:     penicillin v potassium (VEETID) 500 MG tablet, TAKE 1 TABLET BY MOUTH EVERY EIGHT HOURS UNTIL GONE, Disp: , Rfl:     omeprazole (PRILOSEC) 20 MG delayed release capsule, Take 1 capsule by mouth every morning (before breakfast), Disp: 30 capsule, Rfl: 3    etonogestrel (NEXPLANON) 68 MG implant, 68 mg by Subdermal route once, Disp: , Rfl:     loratadine (CLARITIN) 10 MG tablet, Take 1 tablet by mouth daily, Disp: 30 tablet, Rfl: 3    albuterol sulfate HFA (VENTOLIN HFA) 108 (90 Base) MCG/ACT inhaler, Inhale 2 puffs into the lungs 4 times daily as needed for Wheezing, Disp: 18 g, Rfl: 0    fluticasone (FLONASE) 50 MCG/ACT nasal spray, 1 spray by Each Nostril route daily, Disp: 32 g, Rfl: 1    ibuprofen (IBU) 600 MG tablet, Take 1 tablet by mouth every 6 hours as needed for Pain, Disp: 20 tablet, Rfl: 0      Social History:     Social History     Socioeconomic History    Marital status: Single     Spouse name: Not on file    Number of children: Not on file    Years of education: Not on file    Highest education level: Not on file   Occupational History    Not on file   Tobacco Use    Smoking status: Never Smoker    Smokeless tobacco: Never Used   Vaping Use    Vaping Use: Never used   Substance and Sexual Activity    Alcohol use: Yes    Drug use: Never    Sexual activity: Not Currently     Partners: Male     Comment: has been with her boyfriend since 2018   Other Topics Concern    Not on file   Social History Narrative    Not on file     Social Determinants of Health     Financial Resource Strain: Low Risk     Difficulty of Paying Living Expenses: Not hard at all   Food Insecurity: No Food Insecurity    Worried About Running Out of Food in the Last Year: Never true    [address] in the Last Year: Never true   Transportation Needs: No Transportation Needs    Lack of Transportation (Medical): No    Lack of Transportation (Non-Medical):  No   Physical Activity:     Days of Exercise per Week: Not on file    Minutes of Exercise per Session: Not on file   Stress:     Feeling of Stress : Not on file   Social Connections:     Frequency of Communication with Friends and Family: Not on file    Frequency of Social Gatherings with Friends and Family: Not on file    Attends Anabaptist Services: Not on file    Active Member of Clubs or Organizations: Not on file    Attends Club or Organization Meetings: Not on file    Marital Status: Not on file   Intimate Partner Violence:     Fear of Current or Ex-Partner: Not on file    Emotionally Abused: Not on file    Physically Abused: Not on file    Sexually Abused: Not on file   Housing Stability:     Unable to Pay for Housing in the Last Year: Not on file    Number of Places Lived in the Last Year: Not on file    Unstable Housing in the Last Year: Not on file       Family History:     Family History   Problem Relation Age of Onset   [address] [facility] Breast Cancer Mother         diagnosed at 29years old   [address] [facility] Diabetes Father     Obesity Father     Asthma Father     Breast Cancer Maternal Grandmother     Heart Disease Maternal Grandfather     Diabetes Maternal Grandfather     Parkinsonism Maternal Grandfather     No Known Problems Paternal Grandmother     Stroke Paternal Grandfather     Cancer Maternal Uncle         throat    Diabetes Maternal Uncle         Allergies:   Patient has no known allergies. Review of Systems:   Review of Systems   Constitutional: Negative for activity change. HENT: Positive for congestion, postnasal drip, sinus pressure, sore throat, tinnitus and voice change. Hoarse   Eyes: Negative for discharge. Respiratory: Positive for cough. Negative for apnea. Cardiovascular: Negative for chest pain. Gastrointestinal: Negative for abdominal distention. Endocrine: Negative for cold intolerance. Genitourinary: Negative for dysuria. Musculoskeletal: Negative for arthralgias. Skin: Negative for color change. Allergic/Immunologic: Negative for immunocompromised state. Neurological: Negative for dizziness and speech difficulty. Hematological: Negative for adenopathy. Psychiatric/Behavioral: Negative for agitation. Physical Examination :   Blood pressure 107/67, pulse 97, temperature 97.3 °F (36.3 °C), temperature source Temporal, resp. rate 18, height 5' 7\" (1.702 m), weight 139 lb (63 kg), SpO2 97 %, not currently breastfeeding. Physical Exam  Constitutional:       General: She is not in acute distress. Appearance: Normal appearance. HENT:      Head: Normocephalic and atraumatic.       Nose: Nose normal.      Mouth/Throat:      Mouth: Mucous membranes are moist.      Pharynx: Posterior oropharyngeal erythema (very mild) present. Eyes:      General: No scleral icterus. Conjunctiva/sclera: Conjunctivae normal.   Cardiovascular:      Rate and Rhythm: Normal rate and regular rhythm. Heart sounds: Normal heart sounds. No murmur heard. Pulmonary:      Effort: No respiratory distress. Breath sounds: Normal breath sounds. Abdominal:      General: There is no distension. Palpations: Abdomen is soft. Genitourinary:     Comments: No goins  Musculoskeletal:         General: No swelling or deformity. Cervical back: Neck supple. No rigidity. Skin:     General: Skin is dry. Coloration: Skin is not jaundiced. Neurological:      Mental Status: She is alert and oriented to person, place, and time. Cranial Nerves: No cranial nerve deficit. Psychiatric:         Mood and Affect: Mood normal.         Thought Content:  Thought content normal.           Medical Decision Making:   I have independently reviewed/ordered the following labs:    CBCwith Differential:   Lab Results   Component Value Date    WBC 9.1 10/06/2021    WBC 5.6 09/03/2019    HGB 12.1 10/06/2021    HGB 12.0 09/03/2019    HCT 38.3 10/06/2021    HCT 39.3 09/03/2019     10/06/2021     09/03/2019    LYMPHOPCT 18 10/06/2021    LYMPHOPCT 28 09/03/2019    LYMPHOPCT 26.6 11/19/2018    MONOPCT 7 10/06/2021    MONOPCT 6 09/03/2019    EOSPCT 0.6 11/19/2018     BMP:  Lab Results   Component Value Date     10/06/2021     09/03/2019    K 4.5 10/06/2021    K 4.7 09/03/2019     10/06/2021     09/03/2019    CO2 23 10/06/2021    CO2 22 09/03/2019    BUN 14 10/06/2021    BUN 8 09/03/2019    CREATININE 0.82 10/06/2021    CREATININE 0.72 09/03/2019     Hepatic Function Panel:   Lab Results   Component Value Date    PROT 6.6 10/06/2021    PROT 6.9 09/03/2019    LABALBU 4.6 10/06/2021    LABALBU 4.2 09/03/2019    BILITOT 0.34 10/06/2021    BILITOT 0.22 09/03/2019    ALKPHOS 50 10/06/2021    ALKPHOS 33 09/03/2019    ALT 10 10/06/2021    ALT 11 09/03/2019    AST 17 10/06/2021    AST 15 09/03/2019     No results found for: RPR  No results found for: HIV  No results found for: [facility]  Lab Results   Component Value Date    RBC 4.03 10/06/2021    RBC 4.13 11/19/2018    WBC 9.1 10/06/2021     Lab Results   Component Value Date    CREATININE 0.82 10/06/2021    GLUCOSE 68 10/06/2021    GLUCOSE 90 11/19/2018   you for allowing us to participate in the care of this patient. Please call with questions. Luisana Desir MD  - Office: [phone]    Please note that this chart was generated using voice recognition Dragon dictation software. Although every effort was made to ensure the accuracy of this automated transcription, some errors in transcription mayhave occurred.

## 2021-11-21 LAB
DIRECT EXAM: NORMAL
Lab: NORMAL
SPECIMEN DESCRIPTION: NORMAL

## 2021-12-15 ENCOUNTER — OFFICE VISIT (OUTPATIENT)
Dept: INFECTIOUS DISEASES | Age: 21
End: 2021-12-15
Payer: MEDICARE

## 2021-12-15 VITALS
BODY MASS INDEX: 21.82 KG/M2 | WEIGHT: 139 LBS | DIASTOLIC BLOOD PRESSURE: 72 MMHG | HEART RATE: 105 BPM | TEMPERATURE: 98.6 F | HEIGHT: 67 IN | SYSTOLIC BLOOD PRESSURE: 130 MMHG | OXYGEN SATURATION: 96 % | RESPIRATION RATE: 14 BRPM

## 2021-12-15 DIAGNOSIS — R49.0 HOARSENESS: Primary | ICD-10-CM

## 2021-12-15 DIAGNOSIS — J01.00 SUBACUTE MAXILLARY SINUSITIS: ICD-10-CM

## 2021-12-15 PROCEDURE — 99214 OFFICE O/P EST MOD 30 MIN: CPT | Performed by: INTERNAL MEDICINE

## 2021-12-15 PROCEDURE — 1036F TOBACCO NON-USER: CPT | Performed by: INTERNAL MEDICINE

## 2021-12-15 PROCEDURE — G8420 CALC BMI NORM PARAMETERS: HCPCS | Performed by: INTERNAL MEDICINE

## 2021-12-15 PROCEDURE — G8484 FLU IMMUNIZE NO ADMIN: HCPCS | Performed by: INTERNAL MEDICINE

## 2021-12-15 PROCEDURE — G8427 DOCREV CUR MEDS BY ELIG CLIN: HCPCS | Performed by: INTERNAL MEDICINE

## 2021-12-15 ASSESSMENT — ENCOUNTER SYMPTOMS
VOICE CHANGE: 1
COLOR CHANGE: 0
EYE REDNESS: 0
COUGH: 0
EYE DISCHARGE: 0
APNEA: 0
PHOTOPHOBIA: 0
SINUS PRESSURE: 0
SORE THROAT: 0
ABDOMINAL DISTENTION: 0

## 2021-12-15 NOTE — PROGRESS NOTES
[facility] - Initial Consult Note  Today's Date: 12/15/2021    Impression :   New 11/17/21  · covid July 2021   · post covid sinusititis:  · still has hoarse and persistent PND and sinusitis  · Nasal yellow secretions all the time, eyes crusty  · Failed augmentin and doxy ( 1 week, ended 11/14/21) helped  The eyes and the secretions and PND and fluid in ears  · 11/17/21-- still hoarse and still has cough now clear since the doxy, and saliva thick  · Given anti allergies  · flonase x 8/2021    Recommendations   As discussed with the patient I am not concerned about her hoarseness at this point, she is to continue the Flonase for about a month and the saline as needed to make sure she clears her throat in her nose. In her another week or 2 if her hoarseness continues, she could always see ENT for a deep exam.  See me as needed otherwise  ·      Diagnosis Orders   1. Hoarseness     2. Subacute maxillary sinusitis         Return if symptoms worsen or fail to improve.       History of Present Illness:   Mattie Quezada is a 24y.o.-year-old  female who presents with   Chief Complaint   Patient presents with    Sinusitis     swollen lymph nodes     Hoarse     voice is scratchy    new pt 11/17/21  · covid July 2021   · post covid sinusititis:  · still has hoarse and persistent PND and sinusitis and bilat serous otitis media  · Nasal yellow secretions all the time, eyes crusty  · Failed augmentin and doxy ( 1 week, ended 11/14/21) helped  The eyes and the secretions and PND and fluid in ears  · 11/17/21-- still hoarse and still has cough now clear since the doxy, and saliva thick  · Left ear still some clear fluid and sinuses not tender  · Given anti allergies  · flonase x 8/2021    · plan  Hydrate +++, will help saliva, energy  · expectorant cough med, robitussin as needed  · Resume 2 more weeks doxy ( total 3 weeks)  · Keep flonase  · See me in 3 weeks  · Pregnancy test before more doxy is started- pt instructed - last intercourse 2 months ago   · Nasal saline bid very helpful    Visit 12/15/21  Ear pain and nasal discharge all better, at least x 7 days now  Still hoarse and cant talk too long  No PND even in the am  No cough   She has no reflux, on exam her throat is normal, no signs of thrush or irritation to suggest any reflux. Otherwise she still has small lymph nodes over the neck area, bilaterally, almost none abnormal or the axillary area. And she has no large spleen or liver. The neck lymph nodes are post Covid and they would be given some time to resolve. As discussed with the patient I am not concerned about her hoarseness at this point, she is to continue the [facility] for about a month and the saline as needed to make sure she clears her throat in her nose. In her another week or 2 if her hoarseness continues, she could always see ENT for a deep exam.  See me as needed otherwise          I have personally reviewed the past medical history, past surgical history, medications, social history, and family history, and I haveupdated the database accordingly.   Past Medical History:     Past Medical History:   Diagnosis Date    Family history of breast cancer 11/16/2018    History of COVID-19 9/14/2021    Leukocytosis 11/16/2018    Pyelonephritis 11/16/2018       Past Surgical  History:     Past Surgical History:   Procedure Laterality Date    KNEE ARTHROSCOPY Left 2021    With Dr. Fox Mayorga fat pad debridement    WISDOM TOOTH EXTRACTION         Medications:     Current Outpatient Medications:     omeprazole (PRILOSEC) 20 MG delayed release capsule, Take 1 capsule by mouth every morning (before breakfast), Disp: 30 capsule, Rfl: 3    etonogestrel (NEXPLANON) 68 MG implant, 68 mg by Subdermal route once, Disp: , Rfl:     loratadine (CLARITIN) 10 MG tablet, Take 1 tablet by mouth daily, Disp: 30 tablet, Rfl: 3    albuterol sulfate HFA (VENTOLIN HFA) 108 (90 Base) MCG/ACT inhaler, Inhale 2 puffs into the lungs 4 times daily as needed for Wheezing, Disp: 18 g, Rfl: 0    ibuprofen (ADVIL;MOTRIN) 800 MG tablet, TAKE 1 TABLET BY MOUTH EVERY SIX TO EIGHT HOURS AS NEEDED FOR PAIN OR SWELLING, TAKE WITH FOOD. (Patient not taking: Reported on 12/15/2021), Disp: , Rfl:     penicillin v potassium (VEETID) 500 MG tablet, TAKE 1 TABLET BY MOUTH EVERY EIGHT HOURS UNTIL GONE (Patient not taking: Reported on 12/15/2021), Disp: , Rfl:     fluticasone (FLONASE) 50 MCG/ACT nasal spray, 1 spray by Each Nostril route daily (Patient not taking: Reported on 12/15/2021), Disp: 32 g, Rfl: 1    ibuprofen (IBU) 600 MG tablet, Take 1 tablet by mouth every 6 hours as needed for Pain, Disp: 20 tablet, Rfl: 0      Social History:     Social History     Socioeconomic History    Marital status: Single     Spouse name: Not on file    Number of children: Not on file    Years of education: Not on file    Highest education level: Not on file   Occupational History    Not on file   Tobacco Use    Smoking status: Never Smoker    Smokeless tobacco: Never Used   Vaping Use    Vaping Use: Never used   Substance and Sexual Activity    Alcohol use: Yes    Drug use: Never    Sexual activity: Not Currently     Partners: Male     Comment: has been with her boyfriend since 2018   Other Topics Concern    Not on file   Social History Narrative    Not on file     Social Determinants of Health     Financial Resource Strain: Low Risk     Difficulty of Paying Living Expenses: Not hard at all   Food Insecurity: No Food Insecurity    Worried About Running Out of Food in the Last Year: Never true    [address] in the Last Year: Never true   Transportation Needs: No Transportation Needs    Lack of Transportation (Medical): No    Lack of Transportation (Non-Medical):  No   Physical Activity:     Days of Exercise per Week: Not on file    Minutes of Exercise per Session: Not on file   Stress:     Feeling of Stress : Not on file   Social Connections:     Frequency of Communication with Friends and Family: Not on file    Frequency of Social Gatherings with Friends and Family: Not on file    Attends Jehovah's witness Services: Not on file    Active Member of Clubs or Organizations: Not on file    Attends Club or Organization Meetings: Not on file    Marital Status: Not on file   Intimate Partner Violence:     Fear of Current or Ex-Partner: Not on file    Emotionally Abused: Not on file    Physically Abused: Not on file    Sexually Abused: Not on file   Housing Stability:     Unable to Pay for Housing in the Last Year: Not on file    Number of [place] in the Last Year: Not on file    Unstable Housing in the Last Year: Not on file       Family History:     Family History   Problem Relation Age of Onset    Breast Cancer Mother         diagnosed at 29years old   Blayne [place]illed[place] Diabetes Father     Obesity Father     Asthma Father     Breast Cancer Maternal Grandmother     Heart Disease Maternal Grandfather     Diabetes Maternal Grandfather     Parkinsonism Maternal Grandfather     No Known Problems Paternal Grandmother     Stroke Paternal Grandfather     Cancer Maternal Uncle         throat    Diabetes Maternal Uncle         Allergies:   Patient has no known allergies. Review of Systems:   Review of Systems   Constitutional: Negative for activity change. HENT: Positive for voice change. Negative for congestion, postnasal drip, sinus pressure, sore throat and tinnitus. Hoarse   Eyes: Negative for photophobia, discharge and redness. Respiratory: Negative for apnea and cough. Cardiovascular: Negative for chest pain. Gastrointestinal: Negative for abdominal distention. Endocrine: Negative for cold intolerance. Genitourinary: Negative for dysuria. Musculoskeletal: Negative for arthralgias. Skin: Negative for color change. Allergic/Immunologic: Negative for immunocompromised state. Neurological: Negative for dizziness and speech difficulty. Hematological: Positive for adenopathy (cervical reactive bilat - none large over the axillae). Psychiatric/Behavioral: Negative for agitation. Physical Examination :   Blood pressure 130/72, pulse 105, temperature 98.6 °F (37 °C), temperature source Temporal, resp. rate 14, height 5' 7\" (1.702 m), weight 139 lb (63 kg), SpO2 96 %, not currently breastfeeding. Physical Exam  Constitutional:       General: She is not in acute distress. Appearance: Normal appearance. HENT:      Head: Normocephalic and atraumatic. Nose: Nose normal.      Mouth/Throat:      Mouth: Mucous membranes are moist.      Pharynx: No posterior oropharyngeal erythema. Eyes:      General: No scleral icterus. Conjunctiva/sclera: Conjunctivae normal.   Cardiovascular:      Rate and Rhythm: Normal rate and regular rhythm. Heart sounds: Normal heart sounds. No murmur heard. Pulmonary:      Effort: No respiratory distress. Breath sounds: Normal breath sounds. Abdominal:      General: There is no distension. Palpations: Abdomen is soft. Genitourinary:     Comments: No goins  Musculoskeletal:         General: No swelling, tenderness, deformity or signs of injury. Cervical back: Neck supple. No rigidity. Skin:     General: Skin is dry. Capillary Refill: Capillary refill takes more than 3 seconds. Coloration: Skin is not jaundiced. Neurological:      Mental Status: She is alert and oriented to person, place, and time. Cranial Nerves: No cranial nerve deficit. Sensory: No sensory deficit. Motor: No weakness. Psychiatric:         Mood and Affect: Mood normal.         Thought Content:  Thought content normal.           Medical Decision Making:   I have independently reviewed/ordered the following labs:    CBCwith Differential:   Lab Results   Component Value Date    WBC 9.1 10/06/2021    WBC 5.6 09/03/2019 HGB 12.1 10/06/2021    HGB 12.0 09/03/2019    HCT 38.3 10/06/2021    HCT 39.3 09/03/2019     10/06/2021     09/03/2019    LYMPHOPCT 18 10/06/2021    LYMPHOPCT 28 09/03/2019    LYMPHOPCT 26.6 11/19/2018    MONOPCT 7 10/06/2021    MONOPCT 6 09/03/2019    EOSPCT 0.6 11/19/2018     BMP:  Lab Results   Component Value Date     10/06/2021     09/03/2019    K 4.5 10/06/2021    K 4.7 09/03/2019     10/06/2021     09/03/2019    CO2 23 10/06/2021    CO2 22 09/03/2019    BUN 14 10/06/2021    BUN 8 09/03/2019    CREATININE 0.82 10/06/2021    CREATININE 0.72 09/03/2019     Hepatic Function Panel:   Lab Results   Component Value Date    PROT 6.6 10/06/2021    PROT 6.9 09/03/2019    LABALBU 4.6 10/06/2021    LABALBU 4.2 09/03/2019    BILITOT 0.34 10/06/2021    BILITOT 0.22 09/03/2019    ALKPHOS 50 10/06/2021    ALKPHOS 33 09/03/2019    ALT 10 10/06/2021    ALT 11 09/03/2019    AST 17 10/06/2021    AST 15 09/03/2019     No results found for: RPR  No results found for: HIV  No results found for: [facility]  Lab Results   Component Value Date    RBC 4.03 10/06/2021    RBC 4.13 11/19/2018    WBC 9.1 10/06/2021     Lab Results   Component Value Date    CREATININE 0.82 10/06/2021    GLUCOSE 68 10/06/2021    GLUCOSE 90 11/19/2018   you for allowing us to participate in the care of this patient. Please call with questions. Christie Villalobos MD  - Office: [phone]    Please note that this chart was generated using voice recognition Dragon dictation software. Although every effort was made to ensure the accuracy of this automated transcription, some errors in transcription mayhave occurred.